# Patient Record
Sex: MALE | Race: WHITE
[De-identification: names, ages, dates, MRNs, and addresses within clinical notes are randomized per-mention and may not be internally consistent; named-entity substitution may affect disease eponyms.]

---

## 2018-06-02 ENCOUNTER — HOSPITAL ENCOUNTER (EMERGENCY)
Dept: HOSPITAL 61 - ER | Age: 43
Discharge: HOME | End: 2018-06-02
Payer: SELF-PAY

## 2018-06-02 DIAGNOSIS — J44.9: ICD-10-CM

## 2018-06-02 DIAGNOSIS — I10: ICD-10-CM

## 2018-06-02 DIAGNOSIS — M54.41: Primary | ICD-10-CM

## 2018-06-02 DIAGNOSIS — Z86.2: ICD-10-CM

## 2018-06-02 DIAGNOSIS — L03.311: ICD-10-CM

## 2018-06-02 DIAGNOSIS — E11.40: ICD-10-CM

## 2018-06-02 DIAGNOSIS — E78.00: ICD-10-CM

## 2018-06-02 LAB
ADD MAN DIFF?: NO
ALBUMIN SERPL-MCNC: 3.2 G/DL (ref 3.4–5)
ALBUMIN/GLOB SERPL: 0.6 {RATIO} (ref 1–1.7)
ALP SERPL-CCNC: 89 U/L (ref 46–116)
ALT (SGPT): 44 U/L (ref 16–63)
AMPHETAMINE/METHAMPHETAMINE: (no result)
ANION GAP SERPL CALC-SCNC: 11 MMOL/L (ref 6–14)
AST SERPL-CCNC: 27 U/L (ref 15–37)
BACTERIA,URINE: 0 /HPF
BARBITURATES: (no result)
BASO #: 0 X10^3/UL (ref 0–0.2)
BASO %: 1 % (ref 0–3)
BENZODIAZEPINES: (no result)
BILIRUBIN,URINE: NEGATIVE
BLOOD UREA NITROGEN: 17 MG/DL (ref 8–26)
BUN/CREAT SERPL: 17 (ref 6–20)
CALCIUM: 9.2 MG/DL (ref 8.5–10.1)
CANNABINOIDS: (no result)
CHLORIDE: 99 MMOL/L (ref 98–107)
CLARITY,URINE: CLEAR
CO2 SERPL-SCNC: 28 MMOL/L (ref 21–32)
COCAINE: (no result)
COLOR,URINE: YELLOW
CREAT SERPL-MCNC: 1 MG/DL (ref 0.7–1.3)
EOS #: 0.2 X10^3/UL (ref 0–0.7)
EOS %: 3 % (ref 0–3)
ETHANOL, URINE: (no result)
ETHANOL: < 10 MG/DL (ref 0–10)
GFR SERPLBLD BASED ON 1.73 SQ M-ARVRAT: 81.2 ML/MIN
GLOBULIN SER-MCNC: 5.1 G/DL (ref 2.2–3.8)
GLUCOSE SERPL-MCNC: 146 MG/DL (ref 70–99)
GLUCOSE,URINE: NEGATIVE MG/DL
HCG SERPL-ACNC: 8.3 X10^3/UL (ref 4–11)
HEMATOCRIT: 33.6 % (ref 39–53)
HEMOGLOBIN: 11.4 G/DL (ref 13–17.5)
LIPASE: 854 U/L (ref 73–393)
LYMPH #: 2.2 X10^3/UL (ref 1–4.8)
LYMPH %: 27 % (ref 24–48)
MEAN CORPUSCULAR HEMOGLOBIN: 29 PG (ref 25–35)
MEAN CORPUSCULAR HGB CONC: 34 G/DL (ref 31–37)
MEAN CORPUSCULAR VOLUME: 86 FL (ref 79–100)
METHADONE: (no result)
MONO #: 0.4 X10^3/UL (ref 0–1.1)
MONO %: 5 % (ref 0–9)
NEUT #: 5.5 X10^3UL (ref 1.8–7.7)
NEUT %: 65 % (ref 31–73)
NITRITE,URINE: NEGATIVE
OPIATES: (no result)
PH,URINE: 5.5
PHENCYCLIDINE: (no result)
PLATELET COUNT: 265 X10^3/UL (ref 140–400)
POTASSIUM SERPL-SCNC: 4.1 MMOL/L (ref 3.5–5.1)
PROTEIN,URINE: NEGATIVE MG/DL
RBC,URINE: 0 /HPF (ref 0–2)
RED BLOOD COUNT: 3.91 X10^6/UL (ref 4.3–5.7)
RED CELL DISTRIBUTION WIDTH: 17 % (ref 11.5–14.5)
SODIUM: 138 MMOL/L (ref 136–145)
SPECIFIC GRAVITY,URINE: 1.02
SQUAMOUS EPITHELIAL CELL,UR: (no result) /LPF
TOTAL BILIRUBIN: 0.4 MG/DL (ref 0.2–1)
TOTAL PROTEIN: 8.3 G/DL (ref 6.4–8.2)
UROBILINOGEN,URINE: 0.2 MG/DL
WBC,URINE: (no result) /HPF (ref 0–4)

## 2018-06-02 PROCEDURE — 80307 DRUG TEST PRSMV CHEM ANLYZR: CPT

## 2018-06-02 PROCEDURE — 83690 ASSAY OF LIPASE: CPT

## 2018-06-02 PROCEDURE — 85025 COMPLETE CBC W/AUTO DIFF WBC: CPT

## 2018-06-02 PROCEDURE — 99285 EMERGENCY DEPT VISIT HI MDM: CPT

## 2018-06-02 PROCEDURE — 81001 URINALYSIS AUTO W/SCOPE: CPT

## 2018-06-02 PROCEDURE — 36415 COLL VENOUS BLD VENIPUNCTURE: CPT

## 2018-06-02 PROCEDURE — 96375 TX/PRO/DX INJ NEW DRUG ADDON: CPT

## 2018-06-02 PROCEDURE — 74176 CT ABD & PELVIS W/O CONTRAST: CPT

## 2018-06-02 PROCEDURE — 80053 COMPREHEN METABOLIC PANEL: CPT

## 2018-06-02 PROCEDURE — 96374 THER/PROPH/DIAG INJ IV PUSH: CPT

## 2018-06-02 RX ADMIN — FENTANYL CITRATE 1 MCG: 50 INJECTION INTRAMUSCULAR; INTRAVENOUS at 10:26

## 2018-06-02 RX ADMIN — ONDANSETRON 1 MG: 2 INJECTION INTRAMUSCULAR; INTRAVENOUS at 10:24

## 2018-06-02 RX ADMIN — CLINDAMYCIN HYDROCHLORIDE 1 MG: 150 CAPSULE ORAL at 12:39

## 2018-06-02 RX ADMIN — HYDROCODONE BITARTRATE AND ACETAMINOPHEN 1 TAB: 5; 325 TABLET ORAL at 12:40

## 2018-10-25 ENCOUNTER — HOSPITAL ENCOUNTER (OUTPATIENT)
Dept: HOSPITAL 61 - CCL | Age: 43
Discharge: HOME | End: 2018-10-25
Attending: INTERNAL MEDICINE
Payer: COMMERCIAL

## 2018-10-25 VITALS — DIASTOLIC BLOOD PRESSURE: 49 MMHG | SYSTOLIC BLOOD PRESSURE: 118 MMHG

## 2018-10-25 VITALS — SYSTOLIC BLOOD PRESSURE: 132 MMHG | DIASTOLIC BLOOD PRESSURE: 56 MMHG

## 2018-10-25 VITALS — DIASTOLIC BLOOD PRESSURE: 73 MMHG | SYSTOLIC BLOOD PRESSURE: 136 MMHG

## 2018-10-25 VITALS — DIASTOLIC BLOOD PRESSURE: 64 MMHG | SYSTOLIC BLOOD PRESSURE: 140 MMHG

## 2018-10-25 VITALS — DIASTOLIC BLOOD PRESSURE: 65 MMHG | SYSTOLIC BLOOD PRESSURE: 159 MMHG

## 2018-10-25 VITALS — SYSTOLIC BLOOD PRESSURE: 125 MMHG | DIASTOLIC BLOOD PRESSURE: 58 MMHG

## 2018-10-25 VITALS — DIASTOLIC BLOOD PRESSURE: 63 MMHG | SYSTOLIC BLOOD PRESSURE: 115 MMHG

## 2018-10-25 VITALS — HEIGHT: 66 IN | WEIGHT: 315 LBS | BODY MASS INDEX: 50.62 KG/M2

## 2018-10-25 VITALS — DIASTOLIC BLOOD PRESSURE: 66 MMHG | SYSTOLIC BLOOD PRESSURE: 137 MMHG

## 2018-10-25 VITALS — SYSTOLIC BLOOD PRESSURE: 152 MMHG | DIASTOLIC BLOOD PRESSURE: 73 MMHG

## 2018-10-25 VITALS — DIASTOLIC BLOOD PRESSURE: 66 MMHG | SYSTOLIC BLOOD PRESSURE: 138 MMHG

## 2018-10-25 VITALS — DIASTOLIC BLOOD PRESSURE: 73 MMHG | SYSTOLIC BLOOD PRESSURE: 133 MMHG

## 2018-10-25 VITALS — DIASTOLIC BLOOD PRESSURE: 70 MMHG | SYSTOLIC BLOOD PRESSURE: 136 MMHG

## 2018-10-25 DIAGNOSIS — I20.0: Primary | ICD-10-CM

## 2018-10-25 DIAGNOSIS — I10: ICD-10-CM

## 2018-10-25 DIAGNOSIS — Z79.4: ICD-10-CM

## 2018-10-25 DIAGNOSIS — Z79.01: ICD-10-CM

## 2018-10-25 DIAGNOSIS — E66.9: ICD-10-CM

## 2018-10-25 DIAGNOSIS — Z79.899: ICD-10-CM

## 2018-10-25 DIAGNOSIS — Z79.82: ICD-10-CM

## 2018-10-25 LAB
ANION GAP SERPL CALC-SCNC: 6 MMOL/L (ref 6–14)
BUN SERPL-MCNC: 37 MG/DL (ref 8–26)
CALCIUM SERPL-MCNC: 11 MG/DL (ref 8.5–10.1)
CHLORIDE SERPL-SCNC: 98 MMOL/L (ref 98–107)
CO2 SERPL-SCNC: 29 MMOL/L (ref 21–32)
CREAT SERPL-MCNC: 1.3 MG/DL (ref 0.7–1.3)
ERYTHROCYTE [DISTWIDTH] IN BLOOD BY AUTOMATED COUNT: 16.2 % (ref 11.5–14.5)
GFR SERPLBLD BASED ON 1.73 SQ M-ARVRAT: 59.7 ML/MIN
GLUCOSE SERPL-MCNC: 159 MG/DL (ref 70–99)
HCT VFR BLD CALC: 32.8 % (ref 39–53)
HGB BLD-MCNC: 11.3 G/DL (ref 13–17.5)
MCH RBC QN AUTO: 31 PG (ref 25–35)
MCHC RBC AUTO-ENTMCNC: 35 G/DL (ref 31–37)
MCV RBC AUTO: 89 FL (ref 79–100)
PLATELET # BLD AUTO: 307 X10^3/UL (ref 140–400)
POTASSIUM SERPL-SCNC: 4.9 MMOL/L (ref 3.5–5.1)
PROTHROMBIN TIME: 13.3 SEC (ref 11.7–14)
RBC # BLD AUTO: 3.67 X10^6/UL (ref 4.3–5.7)
SODIUM SERPL-SCNC: 133 MMOL/L (ref 136–145)
WBC # BLD AUTO: 10.1 X10^3/UL (ref 4–11)

## 2018-10-25 PROCEDURE — 80048 BASIC METABOLIC PNL TOTAL CA: CPT

## 2018-10-25 PROCEDURE — 93567 NJX CAR CTH SPRVLV AORTGRPHY: CPT

## 2018-10-25 PROCEDURE — 85610 PROTHROMBIN TIME: CPT

## 2018-10-25 PROCEDURE — C1892 INTRO/SHEATH,FIXED,PEEL-AWAY: HCPCS

## 2018-10-25 PROCEDURE — C1769 GUIDE WIRE: HCPCS

## 2018-10-25 PROCEDURE — 99152 MOD SED SAME PHYS/QHP 5/>YRS: CPT

## 2018-10-25 PROCEDURE — G0269 OCCLUSIVE DEVICE IN VEIN ART: HCPCS

## 2018-10-25 PROCEDURE — 99153 MOD SED SAME PHYS/QHP EA: CPT

## 2018-10-25 PROCEDURE — C1771 REP DEV, URINARY, W/SLING: HCPCS

## 2018-10-25 PROCEDURE — 85027 COMPLETE CBC AUTOMATED: CPT

## 2018-10-25 PROCEDURE — 36415 COLL VENOUS BLD VENIPUNCTURE: CPT

## 2018-10-25 PROCEDURE — 93458 L HRT ARTERY/VENTRICLE ANGIO: CPT

## 2018-10-25 NOTE — PDOC
MODERATE SEDATION ASSESSMENT


RISKS/ALTERNATIVES


Risks/Alternatives


Risks and alternatives of this type of sedation and procedure discussed with:


RISK/ALTERNATIVES:  Patient





H & P ON CHART


H & P


H & P on chart and reviewed for co-morbid conditions and appropriate labs.


H&P ON CHART:  Yes





PREGNANCY STATUS


PREG STATUS ASSESSED:  Yes





MEDS/ALLERGIES REVIEWED


Meds/Allergies Reviewed


Medications and Allergies including time and route of recently administered 

narcotics and sedatives.


MEDS/ALLERGIES REVIEWED:  Yes





ASA RATING


ASA RATING:  II





AIRWAY ASSESSMENT


Airway Assessment


Airway patency, oral function limitations, presence  of caps, crowns, dentures, 

partials, and ability to extend neck assessed.


AIRWAY ASSESSMENT:  Yes





MALLAMPATI SCORE


MALLAMPATI SCORE:  II





PRE-SEDATION ASSESSMENT


PRE-SEDATION ASSESSMENT:  Yes











BRANDON DE LOS SANTOS MD Oct 25, 2018 14:45

## 2018-10-26 NOTE — CARD
MR#: [id number]

Account#: [id number]

Accession#: [id number]

Date of Study: 10/25/2018

Ordering Physician: DANNY FAULKNER 

Referring Physician: DANNY FAULKNER 

Tech: RT Seamus (R)





--------------- APPROVED REPORT --------------





Technologist: RT Seamus (R)

Nurse: Yvonne Urena R.N.



Procedure(s) performed: Moderate sedation: 36 minutes



HISTORY

The patient is a 43 year-old male with a history of : hypertension, family history of premature CAD, 
Patient is obese..



INDICATION

The indication(s) include : The patient is having progressive exertional chest pain and was seen as a
n outpatient. We discussed the situation options and risks and he was decided to proceed with a left 
heart catheterization., He is coming in for the procedure..



PROCEDURE NARRATIVE

After obtaining informed consent the patient was taken to the Cath Lab.



With the patient is supine position the right groin area was prepped and draped in the usual fashion.




The area was infiltrated with lidocaine to obtain topical anesthesia.



Using Seldinger technique a Cordis sheath was inserted into the femoral artery.



A left Peter catheter was then advanced all the way to the root of the AO and once there we searche
d the left coronary cusp for the left main.



No left coronary was found.



A view of the left coronary cusp was done.



A right Peter catheter was then advanced and utilized to engage the right coronary os.



Multiple views of the right coronary artery were then done. We found that the patient has aberrant co
ronary anatomy with everything coming off the RCA.



After the views of the coronaries were done a pigtail catheter was then used to perform a ventriculog
garret.



The views were checked and I decided to terminate the procedure at this point.



A view of the femoral artery was done following which the sheath was pulled and then an Angio-Seal co
llagen plug was deployed.



Following this it did not appear to be any bleeding. A dressing was applied to the groin and the arthur
ent was transferred to the recovery room in satisfactory condition after tolerating the procedure rat
her well.



Findings:



Coronaries:

There is no coronary coming off the left coronary cusp. The right coronary artery is a large vessel t
hat gives 2 branches of the proximal segment one traverses across to the LAD and feeds the LAD. The L
AD is normal and gives off small diagonals that are normal,  from the proximal segment of the LAD a v
essel runs off  to the lateral wall

along  the AV groove and this  probably represents the circumflex. It is a small size circumflex. The
 second branch that comes off the proximal RCA goes to the lateral wall and gives off an obtuse eben
nal and it is normal. The RCA then goes along its normal course and gives off the PDA and the postero
lateral branch they are all normal and large.



Ventriculogram: There appears to be concentric left ventricular hypertrophy and the left ventricular 
ejection fraction was estimated to be about 50% visually. The left ventricular end-diastolic pressure
 was 20 mmHg. There was no gradient across the aortic valve on the pullback.









Conclusion

This patient has aberrant coronary anatomy. There is no left main, everything is coming off the RCA a
nd no significant plaquing or disease was seen.



The left ventricle shows mild concentric left ventricular hypertrophy with a low normal ejection frac
tion that was estimated to be about 50%. The patient has a left ventricular end-diastolic pressure of
 20 mmHg.



I would recommend medical treatment, diet, exercise, weight loss, and tight blood pressure control fo
r this patient.







Recommendations

Medical Therapy

Weight Loss Reduction Program



Signed by : Danny Faulkner MD

Electronically Approved : 10/26/2018 16:48:26

## 2019-08-31 ENCOUNTER — HOSPITAL ENCOUNTER (EMERGENCY)
Dept: HOSPITAL 61 - ER | Age: 44
Discharge: HOME | End: 2019-08-31
Payer: SELF-PAY

## 2019-08-31 VITALS — WEIGHT: 315 LBS | BODY MASS INDEX: 50.62 KG/M2 | HEIGHT: 66 IN

## 2019-08-31 VITALS — DIASTOLIC BLOOD PRESSURE: 76 MMHG | SYSTOLIC BLOOD PRESSURE: 161 MMHG

## 2019-08-31 DIAGNOSIS — I10: ICD-10-CM

## 2019-08-31 DIAGNOSIS — E78.00: ICD-10-CM

## 2019-08-31 DIAGNOSIS — J44.9: ICD-10-CM

## 2019-08-31 DIAGNOSIS — M25.551: Primary | ICD-10-CM

## 2019-08-31 DIAGNOSIS — E11.40: ICD-10-CM

## 2019-08-31 PROCEDURE — 99284 EMERGENCY DEPT VISIT MOD MDM: CPT

## 2019-08-31 PROCEDURE — 73502 X-RAY EXAM HIP UNI 2-3 VIEWS: CPT

## 2019-08-31 NOTE — PHYS DOC
Past Medical History


Past Medical History:  Anemia, COPD, Diabetes-Type II, High Cholesterol, H

ypertension, Other


Additional Past Medical Histor:  neuropathy, degenerative rheumatoid arthritis 

bilateral hip/back


Past Surgical History:  No Surgical History


Alcohol Use:  Occasionally


Drug Use:  None





Adult General


Chief Complaint


Chief Complaint:  HIP PAIN





HPI


HPI





Patient is a 44  year old male] who presents with [right hip pain. Patient repor

ts he has a history of several right hip issues, reports he has a lot of 

degeneration both hips, reports while he was walking today he felt his pain in 

his right hip where felt like his hepatitis. Reports he fell to go back and he 

says it has done this several times in the past. Reports after did last time 

today he feels like it has continued to have little increased pain. Denies any 

falls, denies any recent trauma. Denies any recent fever. Reports he has been 

told he needs replacement, he has not had insurance to be able to do this 

before, does report he has a primary care provider and they have changed his 

pain medications at home. States he has taken" before he came in, with some 

improvement.]





Review of Systems


Review of Systems





Constitutional: Denies fever or chills []


Eyes: Denies change in visual acuity, redness, or eye pain []


HENT: Denies nasal congestion or sore throat []


Respiratory: Denies cough or shortness of breath []


Cardiovascular: No additional information not addressed in HPI []


GI: Denies abdominal pain, nausea, vomiting, bloody stools or diarrhea []


: Denies dysuria or hematuria []


Musculoskeletal: Denies back pain complains of right hip pain []


Integument: Denies rash or skin lesions []


Neurologic: Denies headache, focal weakness or sensory changes []


Endocrine: Denies polyuria or polydipsia []





All other systems were reviewed and found to be within normal limits, except as 

documented in this note.





Current Medications


Current Medications





Current Medications








 Medications


  (Trade)  Dose


 Ordered  Sig/Danielito  Start Time


 Stop Time Status Last Admin


Dose Admin


 


 Acetaminophen/


 Hydrocodone Bitart


  (Lortab 5/325)  1 tab  1X  ONCE  8/31/19 14:00


 8/31/19 14:01 DC 8/31/19 14:06


1 TAB











Allergies


Allergies





Allergies








Coded Allergies Type Severity Reaction Last Updated Verified


 


  No Known Drug Allergies    12/2/18 No











Physical Exam


Physical Exam





Constitutional: Well developed, well nourished, no acute distress, non-toxic 

appearance. []


HENT: Normocephalic, atraumatic, bilateral external ears normal, oropharynx 

moist, no oral exudates, nose normal. []


Eyes: PERRLA, EOMI, conjunctiva normal, no discharge. [] 


Neck: Normal range of motion, no tenderness, supple, no stridor. [] 


Cardiovascular:Heart rate regular rhythm, no murmur []


Lungs & Thorax:  Bilateral breath sounds clear to auscultation []


Abdomen: Bowel sounds normal, soft, no tenderness, no masses, no pulsatile 

masses. [] 


Skin: Warm, dry, no erythema, no rash. [] 


Back: No tenderness, no CVA tenderness. [] 


Extremities: No tenderness, no cyanosis, no clubbing, ROM intact, no edema. 

Slightly decreased ROAM to right leg. No swelling.[] 


Neurologic: Alert and oriented X 3, normal motor function, normal sensory 

function, no focal deficits noted. []


Psychologic: Affect normal, judgement normal, mood normal. []





Current Patient Data


Vital Signs





                                   Vital Signs








  Date Time  Temp Pulse Resp B/P (MAP) Pulse Ox O2 Delivery O2 Flow Rate FiO2


 


8/31/19 14:06   15  98 Room Air  


 


8/31/19 13:15 97.7 89  161/76 (104)    





 97.7       











EKG


EKG


[]





Radiology/Procedures


Radiology/Procedures


 


IMPRESSION:


1. Degenerative joint disease, severe at the right hip.


2. No definite acute fracture or dislocation. Detail somewhat limited due 


to body habitus. If clinical concern for fracture persists, consider 


further evaluation with MRI.


 


Electronically signed by: Fan Jimenez MD (8/31/2019 2:45 PM) [facility]


[]





Course & Med Decision Making


Course & Med Decision Making


Pertinent Labs and Imaging studies reviewed. (See chart for details)





[]





Dragon Disclaimer


Dragon Disclaimer


This electronic medical record was generated, in whole or in part, using a voice

 recognition dictation system.





Departure


Departure


Impression:  


   Primary Impression:  


   Right hip pain


Disposition:  01 HOME, SELF-CARE


Condition:  GOOD


Referrals:  


LAURA ROBBINS DO (PCP)


Patient Instructions:  Hip Pain





Additional Instructions:  


As we discussed, rest, take your home pain medications.


Follow up with your primary care


Try to get surgery for your hip as you have previously planned











ROZ GUZMÁN              Aug 31, 2019 15:11

## 2019-08-31 NOTE — RAD
HIP RIGHT 2V WITH PELVIS

 

History: Hip dislocation.

 

Limited bone detail due to body habitus. Severe degenerative narrowing at 

the right hip. No definite acute or displaced fracture is seen.

 

Mild-to-moderate degenerative narrowing at the left hip. No evidence of 

dislocation. Sacroiliac joints and pubic symphysis are grossly intact.

 

IMPRESSION:

1. Degenerative joint disease, severe at the right hip.

2. No definite acute fracture or dislocation. Detail somewhat limited due 

to body habitus. If clinical concern for fracture persists, consider 

further evaluation with MRI.

 

Electronically signed by: Fan Jimenez MD (8/31/2019 2:45 PM) [facility]

## 2020-04-14 ENCOUNTER — HOSPITAL ENCOUNTER (EMERGENCY)
Dept: HOSPITAL 61 - ER | Age: 45
Discharge: HOME | End: 2020-04-14
Payer: COMMERCIAL

## 2020-04-14 VITALS
SYSTOLIC BLOOD PRESSURE: 143 MMHG | DIASTOLIC BLOOD PRESSURE: 74 MMHG | SYSTOLIC BLOOD PRESSURE: 143 MMHG | DIASTOLIC BLOOD PRESSURE: 74 MMHG | SYSTOLIC BLOOD PRESSURE: 143 MMHG | DIASTOLIC BLOOD PRESSURE: 74 MMHG | DIASTOLIC BLOOD PRESSURE: 74 MMHG

## 2020-04-14 VITALS — BODY MASS INDEX: 50.62 KG/M2 | HEIGHT: 66 IN | WEIGHT: 315 LBS

## 2020-04-14 DIAGNOSIS — I10: ICD-10-CM

## 2020-04-14 DIAGNOSIS — E11.40: ICD-10-CM

## 2020-04-14 DIAGNOSIS — R07.89: Primary | ICD-10-CM

## 2020-04-14 DIAGNOSIS — E78.00: ICD-10-CM

## 2020-04-14 DIAGNOSIS — J44.9: ICD-10-CM

## 2020-04-14 LAB
ALBUMIN SERPL-MCNC: 3.1 G/DL (ref 3.4–5)
ALBUMIN/GLOB SERPL: 0.6 {RATIO} (ref 1–1.7)
ALP SERPL-CCNC: 74 U/L (ref 46–116)
ALT SERPL-CCNC: 23 U/L (ref 16–63)
ANION GAP SERPL CALC-SCNC: 9 MMOL/L (ref 6–14)
AST SERPL-CCNC: 22 U/L (ref 15–37)
BASOPHILS # BLD AUTO: 0 X10^3/UL (ref 0–0.2)
BASOPHILS NFR BLD: 0 % (ref 0–3)
BILIRUB SERPL-MCNC: 0.2 MG/DL (ref 0.2–1)
BUN SERPL-MCNC: 23 MG/DL (ref 8–26)
BUN/CREAT SERPL: 26 (ref 6–20)
CALCIUM SERPL-MCNC: 9.2 MG/DL (ref 8.5–10.1)
CHLORIDE SERPL-SCNC: 104 MMOL/L (ref 98–107)
CO2 SERPL-SCNC: 25 MMOL/L (ref 21–32)
CREAT SERPL-MCNC: 0.9 MG/DL (ref 0.7–1.3)
EOSINOPHIL NFR BLD: 0.2 X10^3/UL (ref 0–0.7)
EOSINOPHIL NFR BLD: 3 % (ref 0–3)
ERYTHROCYTE [DISTWIDTH] IN BLOOD BY AUTOMATED COUNT: 17.4 % (ref 11.5–14.5)
GFR SERPLBLD BASED ON 1.73 SQ M-ARVRAT: 90.8 ML/MIN
GLOBULIN SER-MCNC: 4.9 G/DL (ref 2.2–3.8)
GLUCOSE SERPL-MCNC: 159 MG/DL (ref 70–99)
HCT VFR BLD CALC: 34 % (ref 39–53)
HGB BLD-MCNC: 10.9 G/DL (ref 13–17.5)
LYMPHOCYTES # BLD: 2 X10^3/UL (ref 1–4.8)
LYMPHOCYTES NFR BLD AUTO: 28 % (ref 24–48)
MCH RBC QN AUTO: 27 PG (ref 25–35)
MCHC RBC AUTO-ENTMCNC: 32 G/DL (ref 31–37)
MCV RBC AUTO: 84 FL (ref 79–100)
MONO #: 0.3 X10^3/UL (ref 0–1.1)
MONOCYTES NFR BLD: 4 % (ref 0–9)
NEUT #: 4.7 X10^3/UL (ref 1.8–7.7)
NEUTROPHILS NFR BLD AUTO: 64 % (ref 31–73)
PLATELET # BLD AUTO: 222 X10^3/UL (ref 140–400)
POTASSIUM SERPL-SCNC: 4.7 MMOL/L (ref 3.5–5.1)
PROT SERPL-MCNC: 8 G/DL (ref 6.4–8.2)
RBC # BLD AUTO: 4.03 X10^6/UL (ref 4.3–5.7)
SODIUM SERPL-SCNC: 138 MMOL/L (ref 136–145)
WBC # BLD AUTO: 7.3 X10^3/UL (ref 4–11)

## 2020-04-14 PROCEDURE — 83880 ASSAY OF NATRIURETIC PEPTIDE: CPT

## 2020-04-14 PROCEDURE — 36415 COLL VENOUS BLD VENIPUNCTURE: CPT

## 2020-04-14 PROCEDURE — 71045 X-RAY EXAM CHEST 1 VIEW: CPT

## 2020-04-14 PROCEDURE — 80053 COMPREHEN METABOLIC PANEL: CPT

## 2020-04-14 PROCEDURE — 99285 EMERGENCY DEPT VISIT HI MDM: CPT

## 2020-04-14 PROCEDURE — 85025 COMPLETE CBC W/AUTO DIFF WBC: CPT

## 2020-04-14 PROCEDURE — 85379 FIBRIN DEGRADATION QUANT: CPT

## 2020-04-14 PROCEDURE — 93005 ELECTROCARDIOGRAM TRACING: CPT

## 2020-04-14 PROCEDURE — 96374 THER/PROPH/DIAG INJ IV PUSH: CPT

## 2020-04-14 PROCEDURE — 84484 ASSAY OF TROPONIN QUANT: CPT

## 2020-04-14 NOTE — EKG
[facility]

              [address] [place] [address]

Test Date:    2020               Test Time:    09:54:12

Pat Name:     CHRISTOPHER GOOD           Department:   

Patient ID:   [id number]           Room:          

Gender:       M                        Technician:   

:          [id number]               Requested By: EM MILLER

Order Number: [id number]           Reading MD:   Dmitriy Arreguin

                                 Measurements

Intervals                              Axis          

Rate:         86                       P:            35

TX:           194                      QRS:          56

QRSD:         84                       T:            34

QT:           326                                    

QTc:          393                                    

                           Interpretive Statements

SINUS RHYTHM

NONSPECIFIC ST-T WAVE CHANGES.



Electronically Signed On 2020 11:28:25 CDT by Dmitriy Arreguin

## 2020-04-14 NOTE — RAD
CHEST AP ONLY

 

History: Chest pain

 

Comparison: June 12, 2015

 

Findings:

Single view of the chest is submitted. 

There is no new lobar consolidation, pleural fluid, or pneumothorax. 

Appearance of more prominent pericardial cardiac silhouette and 

mediastinal width may be accentuated by differences in technique.

 

Impression: 

 

1. Appearance of more prominent pericardial cardiac silhouette and 

mediastinal width may be accentuated by differences in technique, no 

infiltrate or pleural fluid identified.

 

Electronically signed by: Moise Sierra MD (4/14/2020 11:04 AM) 

[id number]

## 2020-04-14 NOTE — PHYS DOC
Past Medical History


Past Medical History:  Anemia, COPD, Diabetes-Type II, High Cholesterol, H

ypertension, Other


Additional Past Medical Histor:  neuropathy, degenerative rheumatoid arthritis 

bilateral hip/back


Past Surgical History:  No Surgical History


Smoking Status:  Never Smoker


Alcohol Use:  Occasionally


Drug Use:  None





General Adult


EDM:


Chief Complaint:  CHEST PAIN





HPI:


HPI:





Patient is a 44-year-old male with multiple medical problems including 

hypertension hyperlipidemia and diabetes who presents with a 2 to 3-day history 

of sharp left-sided chest pain.  He denies any fever chills or sweats.  He 

denies cough.  He denies hemoptysis.  He states the pain is made worse with 

movement and deep breath.  He does state the pain radiates up into his 

shoulders.  He denies any nausea vomiting or diaphoresis.  []





Review of Systems:


Review of Systems:


Constitutional:  Denies fever or chills. []


Eyes:  Denies change in visual acuity. []


HENT:  Denies nasal congestion or sore throat. [] 


Respiratory:  Denies cough or shortness of breath. [] 


Cardiovascular: Per HPI. [] 


GI:  Denies abdominal pain, nausea, vomiting, bloody stools or diarrhea. [] 


:  Denies dysuria. [] 


Musculoskeletal:  Denies back pain or joint pain. [] 


Integument:  Denies rash. [] 


Neurologic:  Denies headache, focal weakness or sensory changes. [] 


Endocrine:  Denies polyuria or polydipsia. [] 


Lymphatic:  Denies swollen glands. [] 


Psychiatric:  Denies depression or anxiety. []





Heart Score:


HEART Score for Chest Pain:  








HEART Score for Chest Pain Response (Comments) Value


 


History Slighlty/Non-Suspicious 0


 


ECG Normal 0


 


Age >45 - < 65 1


 


Risk Factors >3 Risk Factors or Hx CAD 2


 


Troponin < Normal Limit 0


 


Total  3








Risk Factors:


Risk Factors:  DM, Current or recent (<one month) smoker, HTN, HLP, family 

history of CAD, obesity.


Risk Scores:


Score 0 - 3:  2.5% MACE over next 6 weeks - Discharge Home


Score 4 - 6:  20.3% MACE over next 6 weeks - Admit for Clinical Observation


Score 7 - 10:  72.7% MACE over next 6 weeks - Early Invasive Strategies





Allergies:


Allergies:





Allergies








Coded Allergies Type Severity Reaction Last Updated Verified


 


  No Known Drug Allergies    12/2/18 No











Physical Exam:


PE:





Constitutional: Well developed, well nourished, no acute distress, non-toxic 

appearance. []


HENT: Normocephalic, atraumatic, bilateral external ears normal, oropharynx 

moist, no oral exudates, nose normal. []


Eyes: PERRLA, EOMI, conjunctiva normal, no discharge. [] 


Neck: Normal range of motion, no tenderness, supple, no stridor. [] 


Cardiovascular:Heart rate regular rhythm, no murmur []


Lungs & Thorax: Tender to palp left sternal border which reproduces pain []


Abdomen: Bowel sounds normal, soft, no tenderness, no masses, no pulsatile 

masses. [] 


Skin: Warm, dry, no erythema, no rash. [] 


Back: No tenderness, no CVA tenderness. [] 


Extremities: No tenderness, no cyanosis, no clubbing, ROM intact, no edema. [] 


Neurologic: Alert and oriented X 3, normal motor function, normal sensory 

function, no focal deficits noted. []


Psychologic: Anxious. []





Current Patient Data:


Vital Signs:





                                   Vital Signs








  Date Time  Temp Pulse Resp B/P (MAP) Pulse Ox O2 Delivery O2 Flow Rate FiO2


 


4/14/20 09:50 97.9 87 16 168/79 (108) 97 Room Air  





 97.9       











EKG:


EKG:


EKG: Normal sinus rhythm rate of 80 without ischemic ST-T changes []





Radiology/Procedures:


Radiology/Procedures:


[]


Impression:


STATUS: REG ER            ORD. PHYSICIAN: EM MILLER DO


REASON: chest pain


PROCEDURE: CHEST AP ONLY





CHEST AP ONLY


 


History: Chest pain


 


Comparison: June 12, 2015


 


Findings:


Single view of the chest is submitted. 


There is no new lobar consolidation, pleural fluid, or pneumothorax. 


Appearance of more prominent pericardial cardiac silhouette and 


mediastinal width may be accentuated by differences in technique.


 


Impression: 


 


1. Appearance of more prominent pericardial cardiac silhouette and 


mediastinal width may be accentuated by differences in technique, no 


infiltrate or pleural fluid identified.





Course & Med Decision Making:


Course & Med Decision Making


Pertinent Labs and Imaging studies reviewed. (See chart for details)





[ED course: Evaluation reveals an anxious 44-year-old male with reproducible 

left-sided chest pain.  His troponin and EKG and d-dimer were all negative.  His

 EKG was unrevealing.  I reassured the patient that I do not believe this is a 

cardiac event.  I will have the patient follow-up with his primary care 

physician.  I will have him start on naproxen for symptom relief.]





Dragon Disclaimer:


Dragon Disclaimer:


This electronic medical record was generated, in whole or in part, using a voice

recognition dictation system.





Departure


Departure


Impression:  


   Primary Impression:  


   Chest wall pain


Disposition:  01 HOME, SELF-CARE


Condition:  STABLE


Referrals:  


LAURA ROBBINS DO (PCP)


Patient Instructions:  Chest Pain (Nonspecific)





Additional Instructions:  


Return to the emergency department with any new or concerning symptoms


Scripts


Naproxen (NAPROXEN) 500 Mg Tablet


1 TAB PO BID PRN for PAIN, #30 TAB 1 Refill


   Prov: EM MILLER DO         4/14/20











EM MILLER DO             Apr 14, 2020 10:49

## 2020-06-10 ENCOUNTER — HOSPITAL ENCOUNTER (OUTPATIENT)
Dept: HOSPITAL 61 - SLPLAB | Age: 45
Discharge: HOME | End: 2020-06-10
Attending: INTERNAL MEDICINE
Payer: COMMERCIAL

## 2020-06-10 DIAGNOSIS — G47.61: ICD-10-CM

## 2020-06-10 DIAGNOSIS — G47.33: Primary | ICD-10-CM

## 2020-06-10 DIAGNOSIS — R09.02: ICD-10-CM

## 2020-06-10 PROCEDURE — 95810 POLYSOM 6/> YRS 4/> PARAM: CPT

## 2020-06-12 NOTE — SLEEP
DATE OF STUDY:  06/10/2020



PROCEDURE:  Sleep study.



REFERRING PHYSICIAN:  Adalgisa Gates MD



PRIMARY CARE PHYSICIAN:  Vinicius Oliver MD



The patient is 44 years old who weighs 347 pounds with a BMI of 56.  The

patient's Wilton score was 13.  The patient underwent split night study

performed at [facility].



During the night study, the patient spent 449 minutes in bed and slept for 333

minutes with a sleep efficiency of 74%.  Sleep latency was 95 minutes with a REM

latency of 82 minutes.  Sleep architecture showed normal stage 1 sleep and

normal stage 2 sleep, increased slow wave and normal REM sleep.



During the initial diagnostic portion of the study, the patient slept for 85

minutes.  During that time, there were no obstructive, mixed or central apneas,

but 39 hypopneas.  The patient's AHI was 28 per hour.  Supine AHI 28 per hour

with a REM AHI of 60 per hour.



Nocturnal oximetry study revealed a mean oxygen saturation of 94% with the

lowest of 74%.  A 11.6% of time oxygen saturation remained between 80% and 89%.



EKG with a mean heart rate of 93 beats per minute.  No sustained arrhythmias

observed.



PLMs were seen at index of 30 per hour and 5 per hour caused EEG arousals.



The patient was started on CPAP at 5 cm water and titrated up to 15 cm water. 

At the final pressure, the patient slept for 46 minutes.  The patient had supine

sleep as well as REM sleep.  The patient's AHI was reduced to 1 per hour and

oxygen saturation remained above 91%.  The patient used medium size full face

mask.



IMPRESSION:

1.  Moderate obstructive sleep apnea, with worsening during REM sleep.  Total

AHI 28 per hour with a REM AHI of 60 per hour.

2.  Nocturnal hypoxia secondary to obstructive sleep apnea, but resolved with

CPAP.

3.  Moderate periodic limb movements.



RECOMMENDATIONS:

1.  CPAP at 15 cm water completely eliminated the patient's sleep apnea and

should be used on a nightly basis.

2.  Follow up in 4-6 weeks to assess compliance with CPAP and to document

clinical improvement.

3.  Weight loss is strongly advised.

4.  Avoid CNS depressants.

5.  Cautioned regarding driving until symptoms of sleep apnea resolve with the

use of CPAP.

6.  The patient should also be further evaluated for symptoms of restless legs

during the day.

 



______________________________

ELZA KNIGHT MD



DR:  LINDEN/lilo  JOB#:  [id number] / [id number]

DD:  06/12/2020 11:13  DT:  06/12/2020 11:23



ADALGISA Allen MD, TERRY MD

## 2020-07-16 ENCOUNTER — HOSPITAL ENCOUNTER (EMERGENCY)
Dept: HOSPITAL 61 - ER | Age: 45
Discharge: HOME | End: 2020-07-16
Payer: MEDICAID

## 2020-07-16 VITALS — HEIGHT: 66 IN | BODY MASS INDEX: 50.62 KG/M2 | WEIGHT: 315 LBS

## 2020-07-16 VITALS — SYSTOLIC BLOOD PRESSURE: 165 MMHG

## 2020-07-16 DIAGNOSIS — G89.11: ICD-10-CM

## 2020-07-16 DIAGNOSIS — M16.0: ICD-10-CM
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
## 2020-07-16 DIAGNOSIS — J44.9: ICD-10-CM

## 2020-07-16 DIAGNOSIS — M25.551: Primary | ICD-10-CM

## 2020-07-16 DIAGNOSIS — E11.40: ICD-10-CM
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
## 2020-07-16 DIAGNOSIS — I10: ICD-10-CM

## 2020-07-16 DIAGNOSIS — E78.00: ICD-10-CM
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
## 2020-07-16 PROCEDURE — 99283 EMERGENCY DEPT VISIT LOW MDM: CPT

## 2020-07-16 PROCEDURE — 96372 THER/PROPH/DIAG INJ SC/IM: CPT

## 2020-07-16 PROCEDURE — 73502 X-RAY EXAM HIP UNI 2-3 VIEWS: CPT

## 2020-07-16 NOTE — PHYS DOC
Past Medical History


Past Medical History:  Anemia, COPD, Diabetes-Type II, High Cholesterol, H

ypertension, Other


Additional Past Medical Histor:  neuropathy, degenerative rheumatoid arthritis 

bilateral hip/back


Past Surgical History:  No Surgical History


Smoking Status:  Never Smoker


Alcohol Use:  Occasionally


Drug Use:  None





General Adult


EDM:


Chief Complaint:  HIP PAIN





HPI:


HPI:





Patient is a 44  year old male presenting with hip pain.  Patient history of 

chronic hip pain degenerative joint disease get sciatica chronic pain obesity 

takes [place] at home.  He tripped and bumped his right hip on the side of the 

door has a increased pain and tenderness in that area.  His doctor recommended 

emergency room evaluation for pain control.  No other injury no head injury no 

no new changes.  No back injury just pinpoint injury at the lateral aspect of 

the right hip





Review of Systems:


Review of Systems:


Constitutional:   Denies fever or chills. []


Eyes:   Denies change in visual acuity. []


HENT:   Denies nasal congestion or sore throat. [] 


Respiratory:   Denies cough or shortness of breath. [] 


Cardiovascular:   Denies chest pain or edema. [] 


GI:   


Neurologic:   Denies headache, focal weakness or sensory changes. [] 


Endocrine:   Denies polyuria or polydipsia. [] 


Lymphatic:  Denies swollen glands. [] 


Psychiatric:  Denies depression or anxiety. []





Heart Score:


Risk Factors:


Risk Factors:  DM, Current or recent (<one month) smoker, HTN, HLP, family 

history of CAD, obesity.


Risk Scores:


Score 0 - 3:  2.5% MACE over next 6 weeks - Discharge Home


Score 4 - 6:  20.3% MACE over next 6 weeks - Admit for Clinical Observation


Score 7 - 10:  72.7% MACE over next 6 weeks - Early Invasive Strategies





Current Medications:





Current Medications








 Medications


  (Trade)  Dose


 Ordered  Sig/Danielito  Start Time


 Stop Time Status Last Admin


Dose Admin


 


 Ketorolac


 Tromethamine


  (Toradol Im)  30 mg  1X  ONCE  7/16/20 17:15


 7/16/20 17:18 DC 7/16/20 17:36


30 MG


 


 Oxycodone/


 Acetaminophen


  (Percocet 10/325)  1 tab  1X  ONCE  7/16/20 17:15


 7/16/20 17:18 DC 7/16/20 17:36


1 TAB











Allergies:


Allergies:





Allergies








Coded Allergies Type Severity Reaction Last Updated Verified


 


  No Known Drug Allergies    12/2/18 No











Physical Exam:


PE:





Constitutional: Well developed, over nourished, no acute distress, non-toxic 

appearance. []


HENT: Normocephalic, atraumatic, bilateral external ears normal, oropharynx 

moist, no oral exudates, nose normal. []


Eyes: PERRLA, EOMI, conjunctiva normal, no discharge. [] 


Neck: Normal range of motion, no tenderness, supple, no stridor. [] 


Pulmonary: Normal respiratory effort no increased work of breathing no obvious 

chest wall trauma


Abdomen: Bowel sounds normal, soft, no tenderness, no masses, no pulsatile 

masses. [] 


Skin: Warm, dry, no erythema, no rash. [] 


Back: No tenderness, no CVA tenderness. [] 


Extremities: Pinpoint tenderness over the greater trochanter of the right hip


Neurologic: Alert and oriented X 3, normal motor function, normal sensory 

function, no focal deficits noted. []


Psychologic: Affect normal, judgement normal, mood normal. []





Current Patient Data:


Vital Signs:





                                   Vital Signs








  Date Time  Temp Pulse Resp B/P (MAP) Pulse Ox O2 Delivery O2 Flow Rate FiO2


 


7/16/20 17:36      Room Air  


 


7/16/20 17:00 98.4 92 18 165/74 (104) 97   





 98.4       











EKG:


EKG:


[]





Radiology/Procedures:


Radiology/Procedures:


[]





Course & Med Decision Making:


Course & Med Decision Making


Pertinent Labs and Imaging studies reviewed. (See chart for details)





44-year-old male presenting with hip pain fairly minor trauma has baseline 

degenerative joint disease.'


XRAY negative my read.





Dragon Disclaimer:


Dragon Disclaimer:


This electronic medical record was generated, in whole or in part, using a voice

 recognition dictation system.





Departure


Departure


Impression:  


   Primary Impression:  


   Hip pain


Disposition:  01 HOME, SELF-CARE


Condition:  STABLE


Patient Instructions:  Contusion, Easy-to-Read





Justicifation of Admission Dx:


Justifications for Admission:


Justification of Admission Dx:  N/A











HUSAM LASSITER MD            Jul 16, 2020 17:41

## 2020-07-16 NOTE — RAD
Exam: Right hip 2 views

 

INDICATION: Trauma

 

TECHNIQUE: Frontal and frog-leg lateral views of the right hip

 

Comparisons: None

 

FINDINGS:

Evaluation is limited secondary to body habitus.

 

No displaced fractures are identified. There appears to be at least 

moderate degenerative change at the right hip joint. Soft tissues are 

unremarkable.

 

IMPRESSION:

No displaced fractures identified. Limitations as described above.

 

Electronically signed by: Bebeto Johnson MD (7/16/2020 6:08 PM) [id number]

## 2020-08-06 ENCOUNTER — HOSPITAL ENCOUNTER (OUTPATIENT)
Dept: HOSPITAL 61 - CT | Age: 45
Discharge: HOME | End: 2020-08-06
Attending: INTERNAL MEDICINE
Payer: MEDICAID

## 2020-08-06 DIAGNOSIS — I25.10: ICD-10-CM

## 2020-08-06 DIAGNOSIS — I35.8: Primary | ICD-10-CM

## 2020-08-06 DIAGNOSIS — R59.9: ICD-10-CM

## 2020-08-06 PROCEDURE — 71250 CT THORAX DX C-: CPT

## 2020-08-06 NOTE — RAD
CT CHEST HIGH RESOLUTION WO 

 

INDICATION:  HYPOXEMIA SOA  

 

Comparison: Radiograph 4/14/2020.

 

TECHNIQUE: Unenhanced axial CT sections were obtained through the lungs 

and upper abdomen. Coronal and sagittal multiplanar reconstructions were 

also obtained.

 

Unenhanced thin section axial images were obtained through the lungs. 

Inspiratory and expiratory supine high resolution images were obtained.

 

PQRS compliance statement:

 

One or more of the following individualized dose reduction techniques were

utilized for this examination:

1. Automated exposure control

2. Adjustment of the mA and/or kV according to patient size

3. Use of iterative reconstruction technique

 

FINDINGS:

 

Lungs and Airways: No pulmonary nodule.  No pulmonary mass or 

consolidation No endoluminal lesion. 

 

Inspiratory thin section images demonstrate no evidence of ground glass 

opacity, consolidation, bronchiectasis or interstitial lung disease. No 

pulmonary micronodules, cystic changes or pulmonary fibrosis.

 

Expiratory high resolution images demonstrate extensive bilateral air 

trapping.

 

Pleura: The pleural spaces are normal.

 

Heart and Mediastinum: The visualized portions of the thyroid gland are 

normal in size and attenuation. No axillary or supraclavicular 

lymphadenopathy. Enlarged right mediastinal lymph node measuring 1.2 cm 

(series 5 image 29). Calcified mediastinal and right hilar lymph nodes 

consistent with remote granulomatous disease. Cardiomegaly. Coronary 

artery atherosclerotic disease. Aortic valve leaflet calcification. Normal

caliber thoracic aorta 

 

Abdomen: Dystrophic left adrenal calcification may be due to old infection

or trauma.

 

Bones and Soft Tissues: Degenerative changes of the spine. The soft 

tissues of the chest wall are within normal limits.

 

IMPRESSION: 

1. No evidence of interstitial lung disease.

 

2. Extensive air trapping on expiratory phase imaging, likely due to small

airways disease.

 

3. Coronary artery atherosclerotic disease. Aortic valve leaflet 

calcifications.

 

4. Single enlarged 1.2 cm mediastinal lymph node of uncertain clinical 

significance.

 

Electronically signed by: Moise Rooney MD (8/6/2020 10:02 AM) [id number]

## 2020-08-18 ENCOUNTER — HOSPITAL ENCOUNTER (INPATIENT)
Dept: HOSPITAL 61 - ER | Age: 45
LOS: 2 days | Discharge: HOME | DRG: 603 | End: 2020-08-20
Attending: INTERNAL MEDICINE | Admitting: INTERNAL MEDICINE
Payer: MEDICAID

## 2020-08-18 VITALS — WEIGHT: 315 LBS | BODY MASS INDEX: 50.62 KG/M2 | HEIGHT: 66 IN

## 2020-08-18 DIAGNOSIS — E78.5: ICD-10-CM

## 2020-08-18 DIAGNOSIS — J44.9: ICD-10-CM

## 2020-08-18 DIAGNOSIS — L03.032: Primary | ICD-10-CM

## 2020-08-18 DIAGNOSIS — I10: ICD-10-CM

## 2020-08-18 DIAGNOSIS — G62.9: ICD-10-CM

## 2020-08-18 DIAGNOSIS — L97.529: ICD-10-CM

## 2020-08-18 DIAGNOSIS — E66.01: ICD-10-CM

## 2020-08-18 DIAGNOSIS — E78.00: ICD-10-CM

## 2020-08-18 DIAGNOSIS — E44.1: ICD-10-CM

## 2020-08-18 DIAGNOSIS — E11.621: ICD-10-CM

## 2020-08-18 DIAGNOSIS — E11.42: ICD-10-CM

## 2020-08-18 DIAGNOSIS — L84: ICD-10-CM

## 2020-08-18 DIAGNOSIS — Z82.49: ICD-10-CM

## 2020-08-18 LAB
ALBUMIN SERPL-MCNC: 3 G/DL (ref 3.4–5)
ALBUMIN/GLOB SERPL: 0.6 {RATIO} (ref 1–1.7)
ALP SERPL-CCNC: 79 U/L (ref 46–116)
ALT SERPL-CCNC: 20 U/L (ref 16–63)
ANION GAP SERPL CALC-SCNC: 6 MMOL/L (ref 6–14)
APTT PPP: YELLOW S
AST SERPL-CCNC: 12 U/L (ref 15–37)
BACTERIA #/AREA URNS HPF: 0 /HPF
BASOPHILS # BLD AUTO: 0.1 X10^3/UL (ref 0–0.2)
BASOPHILS NFR BLD: 1 % (ref 0–3)
BILIRUB SERPL-MCNC: 0.3 MG/DL (ref 0.2–1)
BILIRUB UR QL STRIP: NEGATIVE
BUN SERPL-MCNC: 23 MG/DL (ref 8–26)
BUN/CREAT SERPL: 19 (ref 6–20)
CALCIUM SERPL-MCNC: 9.3 MG/DL (ref 8.5–10.1)
CHLORIDE SERPL-SCNC: 99 MMOL/L (ref 98–107)
CO2 SERPL-SCNC: 31 MMOL/L (ref 21–32)
CREAT SERPL-MCNC: 1.2 MG/DL (ref 0.7–1.3)
EOSINOPHIL NFR BLD: 0.4 X10^3/UL (ref 0–0.7)
EOSINOPHIL NFR BLD: 5 % (ref 0–3)
ERYTHROCYTE [DISTWIDTH] IN BLOOD BY AUTOMATED COUNT: 16.8 % (ref 11.5–14.5)
FIBRINOGEN PPP-MCNC: CLEAR MG/DL
GFR SERPLBLD BASED ON 1.73 SQ M-ARVRAT: 64.9 ML/MIN
GLOBULIN SER-MCNC: 4.9 G/DL (ref 2.2–3.8)
GLUCOSE SERPL-MCNC: 265 MG/DL (ref 70–99)
HCT VFR BLD CALC: 32.7 % (ref 39–53)
HGB BLD-MCNC: 10.8 G/DL (ref 13–17.5)
LYMPHOCYTES # BLD: 1.7 X10^3/UL (ref 1–4.8)
LYMPHOCYTES NFR BLD AUTO: 22 % (ref 24–48)
MCH RBC QN AUTO: 29 PG (ref 25–35)
MCHC RBC AUTO-ENTMCNC: 33 G/DL (ref 31–37)
MCV RBC AUTO: 87 FL (ref 79–100)
MONO #: 0.4 X10^3/UL (ref 0–1.1)
MONOCYTES NFR BLD: 5 % (ref 0–9)
NEUT #: 5.3 X10^3/UL (ref 1.8–7.7)
NEUTROPHILS NFR BLD AUTO: 67 % (ref 31–73)
NITRITE UR QL STRIP: NEGATIVE
PH UR STRIP: 7.5 [PH]
PLATELET # BLD AUTO: 245 X10^3/UL (ref 140–400)
POTASSIUM SERPL-SCNC: 4.1 MMOL/L (ref 3.5–5.1)
PROT SERPL-MCNC: 7.9 G/DL (ref 6.4–8.2)
PROT UR STRIP-MCNC: NEGATIVE MG/DL
PROTHROMBIN TIME: 13.8 SEC (ref 11.7–14)
RBC # BLD AUTO: 3.76 X10^6/UL (ref 4.3–5.7)
RBC #/AREA URNS HPF: 0 /HPF (ref 0–2)
SODIUM SERPL-SCNC: 136 MMOL/L (ref 136–145)
SQUAMOUS #/AREA URNS LPF: (no result) /LPF
UROBILINOGEN UR-MCNC: 0.2 MG/DL
WBC # BLD AUTO: 8 X10^3/UL (ref 4–11)
WBC #/AREA URNS HPF: 0 /HPF (ref 0–4)

## 2020-08-18 PROCEDURE — 94640 AIRWAY INHALATION TREATMENT: CPT

## 2020-08-18 PROCEDURE — 87070 CULTURE OTHR SPECIMN AEROBIC: CPT

## 2020-08-18 PROCEDURE — 81001 URINALYSIS AUTO W/SCOPE: CPT

## 2020-08-18 PROCEDURE — 80048 BASIC METABOLIC PNL TOTAL CA: CPT

## 2020-08-18 PROCEDURE — G0378 HOSPITAL OBSERVATION PER HR: HCPCS

## 2020-08-18 PROCEDURE — 96365 THER/PROPH/DIAG IV INF INIT: CPT

## 2020-08-18 PROCEDURE — 96368 THER/DIAG CONCURRENT INF: CPT

## 2020-08-18 PROCEDURE — 94760 N-INVAS EAR/PLS OXIMETRY 1: CPT

## 2020-08-18 PROCEDURE — 36415 COLL VENOUS BLD VENIPUNCTURE: CPT

## 2020-08-18 PROCEDURE — 80053 COMPREHEN METABOLIC PANEL: CPT

## 2020-08-18 PROCEDURE — 96366 THER/PROPH/DIAG IV INF ADDON: CPT

## 2020-08-18 PROCEDURE — 85610 PROTHROMBIN TIME: CPT

## 2020-08-18 PROCEDURE — 73630 X-RAY EXAM OF FOOT: CPT

## 2020-08-18 PROCEDURE — 83605 ASSAY OF LACTIC ACID: CPT

## 2020-08-18 PROCEDURE — 93926 LOWER EXTREMITY STUDY: CPT

## 2020-08-18 PROCEDURE — 82962 GLUCOSE BLOOD TEST: CPT

## 2020-08-18 PROCEDURE — 85025 COMPLETE CBC W/AUTO DIFF WBC: CPT

## 2020-08-18 NOTE — PHYS DOC
Past Medical History


Past Medical History:  Anemia, COPD, Diabetes-Type II, High Cholesterol, H

ypertension, Other


Additional Past Medical Histor:  neuropathy, degenerative rheumatoid arthritis 

bilateral hip/back


Past Surgical History:  No Surgical History


Smoking Status:  Never Smoker


Alcohol Use:  Rarely


Drug Use:  None





General Adult


EDM:


Chief Complaint:  TOE PROBLEM





HPI:


HPI:





Patient is a 45  year old male who presents with an open wound on the greater 

toe of the left lower extremity.  Patient has a large callus on that toe that 

has formed a blister and now he has what looks like a stage II ulcer involving 

the plantar surface of the great toe on the left lower extremity.  In addition 

patient reports that he has had increasing pain in that left lower extremity for

the last 4 days.  Patient noted the blister 4 days ago and today states that the

ulcer on his foot seems to be getting bigger.  He denies fever, chills, sweats, 

chest pain, shortness of breath, change in smell or taste, nausea, vomiting, 

diarrhea, melena, hematochezia.  Patient does have a history of COPD and reports

he has a chronic cough that is unchanged and chronic shortness of breath that is

unchanged.





Review of Systems:


Review of Systems:


Constitutional:   Denies fever or chills. []


Eyes:   Denies change in visual acuity. []


HENT:   Denies nasal congestion or sore throat. [] 


Respiratory:   Denies cough or shortness of breath. [] 


Cardiovascular:   Denies chest pain or edema. [] 


GI:   Denies abdominal pain, nausea, vomiting, bloody stools or diarrhea. [] 


:  Denies dysuria. [] 


Musculoskeletal:   Denies back pain or joint pain. [] 


Integument:   Denies rash. [] 


Neurologic:   Denies headache, focal weakness or sensory changes. [] 


Endocrine:   Denies polyuria or polydipsia. [] 


Lymphatic:  Denies swollen glands. [] 


Psychiatric:  Denies depression or anxiety. []





Heart Score:


Risk Factors:


Risk Factors:  DM, Current or recent (<one month) smoker, HTN, HLP, family 

history of CAD, obesity.


Risk Scores:


Score 0 - 3:  2.5% MACE over next 6 weeks - Discharge Home


Score 4 - 6:  20.3% MACE over next 6 weeks - Admit for Clinical Observation


Score 7 - 10:  72.7% MACE over next 6 weeks - Early Invasive Strategies





Current Medications:





Current Medications








 Medications


  (Trade)  Dose


 Ordered  Sig/Danielito  Start Time


 Stop Time Status Last Admin


Dose Admin


 


 Piperacillin Sod/


 Tazobactam Sod


 4.5 gm/Sodium


 Chloride  100 ml @ 


 200 mls/hr  1X  ONCE  8/18/20 21:00


 8/18/20 21:29 UNV  





 


 Sodium Chloride  500 ml @ 


 500 mls/hr  1X  ONCE  8/18/20 21:00


 8/18/20 21:59 UNV  





 


 Vancomycin HCl


  (Vanco Per


 Pharmacy)  1 each  PRN DAILY  PRN  8/18/20 21:00


   UNV  














Allergies:


Allergies:





Allergies








Coded Allergies Type Severity Reaction Last Updated Verified


 


  No Known Drug Allergies    12/2/18 No











Physical Exam:


PE:





Constitutional: Well developed, morbidly obese, well nourished, no acute 

distress, non-toxic appearance. []


HENT: Normocephalic, atraumatic, bilateral external ears normal, oropharynx 

moist, no oral exudates, nose normal. []


Eyes: PERRLA, EOMI, conjunctiva normal, no discharge. [] 


Neck: Normal range of motion, no tenderness, supple, no stridor. [] 


Cardiovascular:Heart rate regular rhythm, grade 2/6 systolic murmur, no shift of

 PMI, pulses 1 out of 2 the dorsalis pedis bilaterally []


Lungs & Thorax:  Bilateral breath sounds clear to auscultation []


Abdomen: Bowel sounds normal, soft, no tenderness, no masses, no pulsatile 

masses. [] 


Skin: Warm, dry, patient with calluses on both feet as well as erythema and 

edema and calor of the foot calf which extends to just below the knee.  No joint

 effusions were noted.  There is no pain to range of motion of the joints and 

cells.. [] 


Back: No tenderness, no CVA tenderness. [] 


Extremities: No tenderness, no cyanosis, no clubbing, ROM intact, no edema. [] 


Neurologic: Alert and oriented X 3, normal motor function, normal sensory 

function, no focal deficits noted. []


Psychologic: Affect normal, judgement normal, mood normal. []





Current Patient Data:


Vital Signs:





                                   Vital Signs








  Date Time  Temp Pulse Resp B/P (MAP) Pulse Ox O2 Delivery O2 Flow Rate FiO2


 


8/18/20 20:35 97.6 84 12 161/95 (117) 95 Room Air  





 97.6       











EKG:


EKG:


[]





Radiology/Procedures:


Radiology/Procedures:


[]





Course & Med Decision Making:


Course & Med Decision Making


Pertinent Labs and Imaging studies reviewed. (See chart for details)


2219-blood cultures were not obtained prior to antibiotic administration because

 the patient does not have any sirs criteria and it is unlikely to grow anything

 given the isolated infection to the skin.


2344-patient was seen and reevaluated.  Patient responded well to pain 

medication provided here in the emergency department.  We will continue some 

pain medication PRN basis.  The patient was admitted to the hospital service.  

Zosyn and vancomycin was started empirically.


[]





Dragon Disclaimer:


Dragon Disclaimer:


This electronic medical record was generated, in whole or in part, using a voice

 recognition dictation system.





Departure


Departure


Referrals:  


BOOGIE RINCON MD (PCP)





Justicifation of Admission Dx:


Justifications for Admission:


Justification of Admission Dx:  Yes


Comments:


Diabetic foot infection











ADOLFO MURRAY MD          Aug 18, 2020 21:05

## 2020-08-19 VITALS — SYSTOLIC BLOOD PRESSURE: 143 MMHG | DIASTOLIC BLOOD PRESSURE: 69 MMHG

## 2020-08-19 VITALS — DIASTOLIC BLOOD PRESSURE: 68 MMHG | SYSTOLIC BLOOD PRESSURE: 144 MMHG

## 2020-08-19 VITALS — DIASTOLIC BLOOD PRESSURE: 73 MMHG | SYSTOLIC BLOOD PRESSURE: 114 MMHG

## 2020-08-19 VITALS — SYSTOLIC BLOOD PRESSURE: 139 MMHG | DIASTOLIC BLOOD PRESSURE: 92 MMHG

## 2020-08-19 VITALS — SYSTOLIC BLOOD PRESSURE: 112 MMHG | DIASTOLIC BLOOD PRESSURE: 46 MMHG

## 2020-08-19 VITALS — DIASTOLIC BLOOD PRESSURE: 56 MMHG | SYSTOLIC BLOOD PRESSURE: 118 MMHG

## 2020-08-19 LAB
ANION GAP SERPL CALC-SCNC: 6 MMOL/L (ref 6–14)
BASOPHILS # BLD AUTO: 0 X10^3/UL (ref 0–0.2)
BASOPHILS NFR BLD: 0 % (ref 0–3)
BUN SERPL-MCNC: 19 MG/DL (ref 8–26)
CALCIUM SERPL-MCNC: 8.8 MG/DL (ref 8.5–10.1)
CHLORIDE SERPL-SCNC: 99 MMOL/L (ref 98–107)
CO2 SERPL-SCNC: 33 MMOL/L (ref 21–32)
CREAT SERPL-MCNC: 1 MG/DL (ref 0.7–1.3)
EOSINOPHIL NFR BLD: 0.3 X10^3/UL (ref 0–0.7)
EOSINOPHIL NFR BLD: 5 % (ref 0–3)
ERYTHROCYTE [DISTWIDTH] IN BLOOD BY AUTOMATED COUNT: 17.2 % (ref 11.5–14.5)
GFR SERPLBLD BASED ON 1.73 SQ M-ARVRAT: 80.1 ML/MIN
GLUCOSE SERPL-MCNC: 226 MG/DL (ref 70–99)
HCT VFR BLD CALC: 33.5 % (ref 39–53)
HGB BLD-MCNC: 10.9 G/DL (ref 13–17.5)
LYMPHOCYTES # BLD: 1 X10^3/UL (ref 1–4.8)
LYMPHOCYTES NFR BLD AUTO: 16 % (ref 24–48)
MCH RBC QN AUTO: 28 PG (ref 25–35)
MCHC RBC AUTO-ENTMCNC: 33 G/DL (ref 31–37)
MCV RBC AUTO: 87 FL (ref 79–100)
MONO #: 0.4 X10^3/UL (ref 0–1.1)
MONOCYTES NFR BLD: 6 % (ref 0–9)
NEUT #: 4.5 X10^3/UL (ref 1.8–7.7)
NEUTROPHILS NFR BLD AUTO: 74 % (ref 31–73)
PLATELET # BLD AUTO: 216 X10^3/UL (ref 140–400)
POTASSIUM SERPL-SCNC: 4.3 MMOL/L (ref 3.5–5.1)
RBC # BLD AUTO: 3.83 X10^6/UL (ref 4.3–5.7)
SODIUM SERPL-SCNC: 138 MMOL/L (ref 136–145)
WBC # BLD AUTO: 6.2 X10^3/UL (ref 4–11)

## 2020-08-19 PROCEDURE — 0HBNXZZ EXCISION OF LEFT FOOT SKIN, EXTERNAL APPROACH: ICD-10-PCS | Performed by: EMERGENCY MEDICINE

## 2020-08-19 RX ADMIN — ALBUTEROL SULFATE SCH MG: 108 AEROSOL, METERED RESPIRATORY (INHALATION) at 19:55

## 2020-08-19 RX ADMIN — INSULIN LISPRO SCH UNITS: 100 INJECTION, SOLUTION INTRAVENOUS; SUBCUTANEOUS at 12:21

## 2020-08-19 RX ADMIN — INSULIN LISPRO SCH UNITS: 100 INJECTION, SOLUTION INTRAVENOUS; SUBCUTANEOUS at 17:26

## 2020-08-19 RX ADMIN — Medication SCH CAP: at 21:08

## 2020-08-19 RX ADMIN — MULTIPLE VITAMINS W/ MINERALS TAB SCH TAB: TAB at 10:41

## 2020-08-19 RX ADMIN — BUDESONIDE SCH MG: 0.5 INHALANT RESPIRATORY (INHALATION) at 11:00

## 2020-08-19 RX ADMIN — BUDESONIDE SCH MG: 0.5 INHALANT RESPIRATORY (INHALATION) at 15:28

## 2020-08-19 RX ADMIN — PIPERACILLIN SODIUM AND TAZOBACTAM SODIUM SCH MLS/HR: 4; .5 INJECTION, POWDER, LYOPHILIZED, FOR SOLUTION INTRAVENOUS at 05:58

## 2020-08-19 RX ADMIN — LISINOPRIL SCH MG: 20 TABLET ORAL at 10:41

## 2020-08-19 RX ADMIN — INSULIN LISPRO SCH UNITS: 100 INJECTION, SOLUTION INTRAVENOUS; SUBCUTANEOUS at 12:20

## 2020-08-19 RX ADMIN — METOPROLOL TARTRATE SCH MG: 25 TABLET ORAL at 21:09

## 2020-08-19 RX ADMIN — GABAPENTIN SCH MG: 300 CAPSULE ORAL at 21:08

## 2020-08-19 RX ADMIN — GABAPENTIN SCH MG: 300 CAPSULE ORAL at 14:37

## 2020-08-19 RX ADMIN — HYDROMORPHONE HYDROCHLORIDE PRN MG: 2 INJECTION INTRAMUSCULAR; INTRAVENOUS; SUBCUTANEOUS at 08:50

## 2020-08-19 RX ADMIN — HYDROMORPHONE HYDROCHLORIDE PRN MG: 2 INJECTION INTRAMUSCULAR; INTRAVENOUS; SUBCUTANEOUS at 17:20

## 2020-08-19 RX ADMIN — METOPROLOL TARTRATE SCH MG: 25 TABLET ORAL at 10:42

## 2020-08-19 RX ADMIN — BUDESONIDE SCH MG: 0.5 INHALANT RESPIRATORY (INHALATION) at 19:55

## 2020-08-19 RX ADMIN — ALBUTEROL SULFATE SCH MG: 108 AEROSOL, METERED RESPIRATORY (INHALATION) at 12:00

## 2020-08-19 RX ADMIN — PIPERACILLIN SODIUM AND TAZOBACTAM SODIUM SCH MLS/HR: 4; .5 INJECTION, POWDER, LYOPHILIZED, FOR SOLUTION INTRAVENOUS at 01:30

## 2020-08-19 RX ADMIN — ASPIRIN 81 MG SCH MG: 81 TABLET ORAL at 10:41

## 2020-08-19 RX ADMIN — PIPERACILLIN SODIUM AND TAZOBACTAM SODIUM SCH MLS/HR: 4; .5 INJECTION, POWDER, LYOPHILIZED, FOR SOLUTION INTRAVENOUS at 12:17

## 2020-08-19 RX ADMIN — ALBUTEROL SULFATE SCH MG: 108 AEROSOL, METERED RESPIRATORY (INHALATION) at 15:28

## 2020-08-19 RX ADMIN — PIPERACILLIN SODIUM AND TAZOBACTAM SODIUM SCH MLS/HR: 4; .5 INJECTION, POWDER, LYOPHILIZED, FOR SOLUTION INTRAVENOUS at 17:20

## 2020-08-19 NOTE — NUR
Pharmacy Vancomycin Dosing Note



S:Consulted to monitor and dose vancomycin started 08/18/20.



O:CHRISTOPHER GOOD is a 45 year old M with 

Cellulitis DIABETIC FOOT INFECTION .



Height: 5 feet, 6 inches

Weight: 163.6 kg

Ideal Body Weight: 63.80 

Adjusted Body Weight: 103.72 

Dosing Weight: Actual



Other Antibiotics: 

ZOSYN 4.5 GM Q6H



LABS:

Last BUN: 23 

Last Creatinine: 1.2 

Creatinine Clearance: 111 mL/min

Last WBC: 8 

Last Procalcitonin: 

Tmax (past 24 hours): 



Microbiology: 



I/O: 



Drug Levels:

Last  level:  on  at 

Last dose given 08/18/20 at 2200 



Vancomycin Dosing:

Loading Dose: 2000 mg x1

Dosing Weight: Actual

Target Trough: 10-20





A: Based on: WT AND CRCL





P: 1. Begin Vancomycin 1500 mg IV q8h

   2. Follow up Trough level on 08/19/20 at 2130 

   3. Pharmacy will continue to monitor, follow and adjust therapy as needed.

 

 MARIA M MCKEON RPH, 08/19/20 0021

-------------------------------------------------------------------------------

Signed:    08/19/20 at 0021 by MARIA M MCKEON RPH PHA

-------------------------------------------------------------------------------

## 2020-08-19 NOTE — PDOC1
History and Physical


Date of Admission


Date of Admission


DATE: 8/19/20 


TIME: 07:44





Identification/Chief Complaint


Chief Complaint


Left toe pain





Source


Source:  Patient





History of Present Illness


History of Present Illness


Patient is a 45-year-old male with past medical history of type 2 diabetes and 

diabetic neuropathy, who presents to the ER with complaints of a painful blister

to his left big toe for the past 4 days.  States he noticed a blister to his 

left big toe 5 days ago, and this blister ruptured 4 days ago.  Blister drained 

clear fluid, and patient denies any purulent drainage.  He denies any known 

injury.  He has treated his bloodstream with topical antibiotics, without any 

improvement.  He reports sharp pain, 6/10.  Denies any associated fever.





Past Medical History


Cardiovascular:  HTN, Hyperlipidemia


Pulmonary:  COPD


CENTRAL NERVOUS SYSTEM:  Periperal neuropathy


Hepatobiliary:  No pertinent hx


Musculoskeletal:   low back pain


Infectious disease:  No pertinent hx


Endocrine:  Diabetes





Past Surgical History


Past Surgical History:  No pertinent history





Family History


Family History:  Hypertension





Social History


Smoke:  No


ALCOHOL:  occassional


Drugs:  None





Current Problem List


Problems:  


(1) Diabetic foot ulcer





Current Medications


Current Medications





Current Medications


Sodium Chloride 500 ml @  500 mls/hr 1X  ONCE IV  Last administered on 8/18/20at

21:43;  Start 8/18/20 at 21:30;  Stop 8/18/20 at 22:29;  Status DC


Vancomycin HCl (Vanco Per Pharmacy) 1 each PRN DAILY  PRN MC SEE COMMENTS Last 

administered on 8/19/20at 00:20;  Start 8/18/20 at 21:00


Piperacillin Sod/ Tazobactam Sod 4.5 gm/Sodium Chloride 100 ml @  200 mls/hr 1X 

ONCE IV  Last administered on 8/18/20at 21:32;  Start 8/18/20 at 21:30;  Stop 

8/18/20 at 21:59;  Status DC


Vancomycin HCl 2 gm/Sodium Chloride 500 ml @  250 mls/hr 1X  ONCE IV  Last 

administered on 8/18/20at 21:32;  Start 8/18/20 at 22:00;  Stop 8/18/20 at 

23:59;  Status DC


Piperacillin Sod/ Tazobactam Sod 4.5 gm/Sodium Chloride 100 ml @  200 mls/hr 

Q6HRS IV ;  Start 8/19/20 at 00:00;  Status Cancel


Hydromorphone HCl (Dilaudid) 1 mg 1X  ONCE IV  Last administered on 8/19/20at 

00:16;  Start 8/19/20 at 00:00;  Stop 8/19/20 at 00:01;  Status DC


Hydromorphone HCl (Dilaudid) 0.5 mg PRN Q4HRS  PRN IV SEVERE PAIN 7-10;  Start 

8/18/20 at 23:45


Piperacillin Sod/ Tazobactam Sod 4.5 gm/Sodium Chloride 100 ml @  200 mls/hr 

Q6HRS IV  Last administered on 8/19/20at 05:58;  Start 8/19/20 at 02:00


Vancomycin HCl 1.5 gm/Sodium Chloride 500 ml @  250 mls/hr Q8HRS IV  Last admin

istered on 8/19/20at 06:43;  Start 8/19/20 at 06:00


Vancomycin HCl (Vancomycin Trough Level) 1 each 1X  ONCE MC ;  Start 8/19/20 at 

21:30;  Stop 8/19/20 at 21:31





Active Scripts


Active


Naproxen 500 Mg Tablet 1 Tab PO BID PRN


Percocet  Mg Tablet ** (Oxycodone/Acetaminophen) 1 Each Tablet 1 Tab PO 

PRN Q6HRS PRN 14 Days


Colace (Docusate Sodium) 100 Mg Capsule 100 Mg PO PRN BID PRN 30 Days


Reported


Norco 5-325 Tablet (Acetaminophen/Hydrocodone Bitart) 1 Each Tablet 1 Tab PO PRN

BID PRN


Lantus Solostar (Insulin Glargine,Hum.rec.anlog) 100 Unit/1 Ml Insuln.pen 20 

Unit SQ QHS


Gabapentin ** (Gabapentin) 300 Mg Capsule 300 Mg PO TID


Janumet 50-1,000 Mg Tablet (Sitagliptin Phos/Metformin Hcl) 1 Each Tablet 1 Tab 

PO BID


Multivitamins (Multivitamin) 1 Each Tablet 1 Tab PO DAILY


Metoprolol Tartrate 25 Mg Tablet 1 Tab PO BID


Livalo (Pitavastatin Calcium) 4 Mg Tablet 4 Mg PO 


Aspirin 81 Mg Tab.chew 1 Tab PO DAILY


Iron Supplement (Ferrous Sulfate) 325 Mg Tablet 65 Mg PO 


Proventil Hfa Inhaler (Albuterol Sulfate) 6.7 Gm Hfa.aer.ad 2 Puff IH BID


Lisinopril-Hctz 20-25 Mg Tab (Lisinopril/Hydrochlorothiazide) 1 Each Tablet 1 

Tab PO DAILY


Lovastatin 20 Mg Tablet 10 Mg PO HS


Symbicort 160-4.5 Mcg Inhaler (Budesonide/Formoterol Fumarate) 10.2 Gm 

Hfa.aer.ad 2 Puff IH BID





Allergies


Allergies:  


Coded Allergies:  


     No Known Drug Allergies (Unverified , 12/2/18)





ROS


General:  No: Chills, Fatigue


PSYCHOLOGICAL ROS:  No: Anxiety, Depression


Eyes:  No Blurry vision, No Loss of vision


HEENT:  No: Heacaches, Visual Changes, Hearing change, Epistaxis


Respiratory:  No: Cough, Pleuritic Pain, Shortness of breath


Cardiovascular:  No Chest Pain, No Palpitations, No Paroxysmal Noc. Dyspnea


Gastrointestinal:  No Nausea, No Vomiting


Musculoskeletal:  Yes Joint Pain, Yes Pain In: (Left 1st toe); 


   No Joint Swelling


Neurological:  Yes Numbness/Tingling (Bilateral feet); 


   No Visual Changes


Skin:  Yes Other (Blister to left 1st toe)





Physical Exam


General:  Alert, Oriented X3, Cooperative, No acute distress


HEENT:  PERRLA, Mucous membr. moist/pink


Lungs:  Clear to auscultation, Normal air movement


Heart:  S1S2, RRR


Cardiovascular:  S1, S2


Abdomen:  Normal bowel sounds, Other (Obese)


Extremities:  Normal pulses, Other (+1 edema bilaterally)


Skin:  Other (~2x3 cm superficial ulceration to lateral left big toe, minimal 

surounding erythema.)


Neuro:  Other (Diminished senation to bilateral feet)


Psych/Mental Status:  Mental status NL, Mood NL





Vitals


Vitals





Vital Signs








  Date Time  Temp Pulse Resp B/P (MAP) Pulse Ox O2 Delivery O2 Flow Rate FiO2


 


8/19/20 03:00   20   Room Air  


 


8/19/20 00:47 98.4 98  114/73 (87) 94   





 98.4       











Labs


Labs





Laboratory Tests








Test


 8/18/20


21:57 8/18/20


22:35


 


White Blood Count


 8.0 x10^3/uL


(4.0-11.0) 





 


Red Blood Count


 3.76 x10^6/uL


(4.30-5.70) 





 


Hemoglobin


 10.8 g/dL


(13.0-17.5) 





 


Hematocrit


 32.7 %


(39.0-53.0) 





 


Mean Corpuscular Volume 87 fL ()  


 


Mean Corpuscular Hemoglobin 29 pg (25-35)  


 


Mean Corpuscular Hemoglobin


Concent 33 g/dL


(31-37) 





 


Red Cell Distribution Width


 16.8 %


(11.5-14.5) 





 


Platelet Count


 245 x10^3/uL


(140-400) 





 


Neutrophils (%) (Auto) 67 % (31-73)  


 


Lymphocytes (%) (Auto) 22 % (24-48)  


 


Monocytes (%) (Auto) 5 % (0-9)  


 


Eosinophils (%) (Auto) 5 % (0-3)  


 


Basophils (%) (Auto) 1 % (0-3)  


 


Neutrophils # (Auto)


 5.3 x10^3/uL


(1.8-7.7) 





 


Lymphocytes # (Auto)


 1.7 x10^3/uL


(1.0-4.8) 





 


Monocytes # (Auto)


 0.4 x10^3/uL


(0.0-1.1) 





 


Eosinophils # (Auto)


 0.4 x10^3/uL


(0.0-0.7) 





 


Basophils # (Auto)


 0.1 x10^3/uL


(0.0-0.2) 





 


Prothrombin Time


 13.8 SEC


(11.7-14.0) 





 


Prothromb Time International


Ratio 1.1 (0.8-1.1) 


 





 


Sodium Level


 136 mmol/L


(136-145) 





 


Potassium Level


 4.1 mmol/L


(3.5-5.1) 





 


Chloride Level


 99 mmol/L


() 





 


Carbon Dioxide Level


 31 mmol/L


(21-32) 





 


Anion Gap 6 (6-14)  


 


Blood Urea Nitrogen


 23 mg/dL


(8-26) 





 


Creatinine


 1.2 mg/dL


(0.7-1.3) 





 


Estimated GFR


(Cockcroft-Gault) 64.9 


 





 


BUN/Creatinine Ratio 19 (6-20)  


 


Glucose Level


 265 mg/dL


(70-99) 





 


Lactic Acid Level


 2.2 mmol/L


(0.4-2.0) 





 


Calcium Level


 9.3 mg/dL


(8.5-10.1) 





 


Total Bilirubin


 0.3 mg/dL


(0.2-1.0) 





 


Aspartate Amino Transf


(AST/SGOT) 12 U/L (15-37) 


 





 


Alanine Aminotransferase


(ALT/SGPT) 20 U/L (16-63) 


 





 


Alkaline Phosphatase


 79 U/L


() 





 


Total Protein


 7.9 g/dL


(6.4-8.2) 





 


Albumin


 3.0 g/dL


(3.4-5.0) 





 


Albumin/Globulin Ratio 0.6 (1.0-1.7)  


 


Urine Collection Type  Unknown 


 


Urine Color  Yellow 


 


Urine Clarity  Clear 


 


Urine pH  7.5 (<5.0-8.0) 


 


Urine Specific Gravity


 


 1.020


(1.000-1.030)


 


Urine Protein


 


 Negative mg/dL


(NEG-TRACE)


 


Urine Glucose (UA)


 


 100 mg/dL


(NEG)


 


Urine Ketones (Stick)


 


 Negative mg/dL


(NEG)


 


Urine Blood  Negative (NEG) 


 


Urine Nitrite  Negative (NEG) 


 


Urine Bilirubin  Negative (NEG) 


 


Urine Urobilinogen Dipstick


 


 0.2 mg/dL (0.2


mg/dL)


 


Urine Leukocyte Esterase  Negative (NEG) 


 


Urine RBC  0 /HPF (0-2) 


 


Urine WBC  0 /HPF (0-4) 


 


Urine Squamous Epithelial


Cells 


 Few /LPF 





 


Urine Bacteria  0 /HPF (0-FEW) 








Laboratory Tests








Test


 8/18/20


21:57 8/18/20


22:35


 


White Blood Count


 8.0 x10^3/uL


(4.0-11.0) 





 


Red Blood Count


 3.76 x10^6/uL


(4.30-5.70) 





 


Hemoglobin


 10.8 g/dL


(13.0-17.5) 





 


Hematocrit


 32.7 %


(39.0-53.0) 





 


Mean Corpuscular Volume 87 fL ()  


 


Mean Corpuscular Hemoglobin 29 pg (25-35)  


 


Mean Corpuscular Hemoglobin


Concent 33 g/dL


(31-37) 





 


Red Cell Distribution Width


 16.8 %


(11.5-14.5) 





 


Platelet Count


 245 x10^3/uL


(140-400) 





 


Neutrophils (%) (Auto) 67 % (31-73)  


 


Lymphocytes (%) (Auto) 22 % (24-48)  


 


Monocytes (%) (Auto) 5 % (0-9)  


 


Eosinophils (%) (Auto) 5 % (0-3)  


 


Basophils (%) (Auto) 1 % (0-3)  


 


Neutrophils # (Auto)


 5.3 x10^3/uL


(1.8-7.7) 





 


Lymphocytes # (Auto)


 1.7 x10^3/uL


(1.0-4.8) 





 


Monocytes # (Auto)


 0.4 x10^3/uL


(0.0-1.1) 





 


Eosinophils # (Auto)


 0.4 x10^3/uL


(0.0-0.7) 





 


Basophils # (Auto)


 0.1 x10^3/uL


(0.0-0.2) 





 


Prothrombin Time


 13.8 SEC


(11.7-14.0) 





 


Prothromb Time International


Ratio 1.1 (0.8-1.1) 


 





 


Sodium Level


 136 mmol/L


(136-145) 





 


Potassium Level


 4.1 mmol/L


(3.5-5.1) 





 


Chloride Level


 99 mmol/L


() 





 


Carbon Dioxide Level


 31 mmol/L


(21-32) 





 


Anion Gap 6 (6-14)  


 


Blood Urea Nitrogen


 23 mg/dL


(8-26) 





 


Creatinine


 1.2 mg/dL


(0.7-1.3) 





 


Estimated GFR


(Cockcroft-Gault) 64.9 


 





 


BUN/Creatinine Ratio 19 (6-20)  


 


Glucose Level


 265 mg/dL


(70-99) 





 


Lactic Acid Level


 2.2 mmol/L


(0.4-2.0) 





 


Calcium Level


 9.3 mg/dL


(8.5-10.1) 





 


Total Bilirubin


 0.3 mg/dL


(0.2-1.0) 





 


Aspartate Amino Transf


(AST/SGOT) 12 U/L (15-37) 


 





 


Alanine Aminotransferase


(ALT/SGPT) 20 U/L (16-63) 


 





 


Alkaline Phosphatase


 79 U/L


() 





 


Total Protein


 7.9 g/dL


(6.4-8.2) 





 


Albumin


 3.0 g/dL


(3.4-5.0) 





 


Albumin/Globulin Ratio 0.6 (1.0-1.7)  


 


Urine Collection Type  Unknown 


 


Urine Color  Yellow 


 


Urine Clarity  Clear 


 


Urine pH  7.5 (<5.0-8.0) 


 


Urine Specific Gravity


 


 1.020


(1.000-1.030)


 


Urine Protein


 


 Negative mg/dL


(NEG-TRACE)


 


Urine Glucose (UA)


 


 100 mg/dL


(NEG)


 


Urine Ketones (Stick)


 


 Negative mg/dL


(NEG)


 


Urine Blood  Negative (NEG) 


 


Urine Nitrite  Negative (NEG) 


 


Urine Bilirubin  Negative (NEG) 


 


Urine Urobilinogen Dipstick


 


 0.2 mg/dL (0.2


mg/dL)


 


Urine Leukocyte Esterase  Negative (NEG) 


 


Urine RBC  0 /HPF (0-2) 


 


Urine WBC  0 /HPF (0-4) 


 


Urine Squamous Epithelial


Cells 


 Few /LPF 





 


Urine Bacteria  0 /HPF (0-FEW) 











VTE Prophylaxis Ordered


VTE Prophylaxis Devices:  No


VTE Pharmacological Prophylaxi:  Yes





Assessment/Plan


Assessment/Plan


Diabetic foot ulcer and type 2 diabetes: We will treat empirically with 

vancomycin and Zosyn.  Consults to wound care physician and wound care nurse.  

We will obtain arterial duplex to evaluate patient's blood supply.  Patient 

superficial ulcer can likely be managed with wound care and switch to oral 

antibiotics, barring normal blood flow.  Patient states he has a cardiology 

appointment on Friday August 21, that if at all possible he would like to make.





Justicifation of Admission Dx:


Justifications for Admission:


Justification of Admission Dx:  Yes





Problem Qualifiers





(1) Diabetic foot ulcer:  


Diabetic foot ulcer location:  toe  Diabetes mellitus type:  type 2  Laterality:

 left








MARY LOU MO MD            Aug 19, 2020 07:58

## 2020-08-19 NOTE — RAD
INDICATION: Reason: CONCERN FOR OSTEOMYELITIS / Spl. Instructions:  / 

History: , Soft tissue swelling

 

COMPARISON: None.

 

IMPRESSION: 

 

Left foot: 3 views obtained. Soft tissue swelling is identified at the 

first digit distally with apparent soft tissue defect within the region 

which could be secondary to an ulcer if the patient has evidence of ulcer 

in the region. Plantar calcaneal spur. No definite acute fracture or 

dislocation. If there is high clinical concern for osteomyelitis MRI or 

bone scan could better assess given the limits of plain film.

 

Electronically signed by: Ben Hurley MD (8/19/2020 12:22 AM) 

[id number]

## 2020-08-19 NOTE — NUR
ADMIT NOTE

The patient, CHRISTOPHER GOOD, 46 y/o, M admitted by SRAVAN HOBBS MD, was given 
written information regarding hospital policies, unit procedures and contact persons.  

Patient orientated to room, plan of care reviewed and admit packet discussed.

Valuables were checked and left in room with patient.

Patient now in chair, call light within reach and no other needs voiced at this time.

## 2020-08-19 NOTE — NUR
SW following. Discussed with RN, pt from home, room air, ada diet, gets around fine. Wound 
care consulted. SW will continue to follow.

## 2020-08-19 NOTE — RAD
EXAM: Left lower extremity arterial Doppler.

 

HISTORY: Nonhealing left toe ulcer.

 

COMPARISON: None.

 

FINDINGS: Grayscale and Doppler analysis of the left lower extremity 

arterial system was performed.

 

There are triphasic waveforms throughout the left lower extremity. There 

are no elevated peak systolic velocities suggestive of focal stenosis. The

dorsalis pedis artery is patent. The peroneal artery is not visualized and

may be diminutive or occluded.

 

IMPRESSION: 

1. The peroneal artery may be diminutive or occluded. Otherwise, no 

evidence of hemodynamically significant stenosis is identified.

 

Electronically signed by: SHELBY Sesay MD (8/19/2020 2:08 PM) 

[id number]

## 2020-08-19 NOTE — PDOC2
CONSULT


Date of Consult


Date of Consult


DATE: 8/19/20 


TIME: 15:36





Reason for Consult


Reason for Consult:


Left great toe DFU with cellulitis





History of Present Illness


Reason for Visit:


Pt states he noticed a blister to his left great toe 5 days ago.  The blister 

ruptured 4 days ago and drained clear fluid.  Patient states that shortly 

thereafter he began to notice swelling and redness of his left lower leg.  

Patient states that he has had diabetes for 4 years and was originally diagnosed

with a hemoglobin A1c of 18.  Patient states that his most recent hemoglobin A1c

was 5.6.  Patient states that he has had neuropathy of his bilateral feet and 

hands for multiple years prior to the diagnosis of diabetes.  Patient denies 

history of other diabetic foot ulcers or difficulty with wound healing.  Patient

denies systemic symptoms including fever or body aches.





Past Medical History


Cardiovascular:  HTN, Hyperlipidemia


Pulmonary:  COPD


CENTRAL NERVOUS SYSTEM:  Periperal neuropathy


Hepatobiliary:  No pertinent hx


Musculoskeletal:   low back pain


Infectious disease:  No pertinent hx


Endocrine:  Diabetes





Past Surgical History


Past Surgical History:  No pertinent history





Family History


Family History:  Hypertension





Social History


No


ALCOHOL:  occassional


Drugs:  None





Current Problem List


Problem List


Left great toe DFU with associated cellulitis





Current Medications


Current Medications





Current Medications


Sodium Chloride 500 ml @  500 mls/hr 1X  ONCE IV  Last administered on 8/18/20at

21:43;  Start 8/18/20 at 21:30;  Stop 8/18/20 at 22:29;  Status DC


Vancomycin HCl (Vanco Per Pharmacy) 1 each PRN DAILY  PRN MC SEE COMMENTS Last 

administered on 8/19/20at 00:20;  Start 8/18/20 at 21:00;  Stop 8/19/20 at 

09:37;  Status DC


Piperacillin Sod/ Tazobactam Sod 4.5 gm/Sodium Chloride 100 ml @  200 mls/hr 1X 

ONCE IV  Last administered on 8/18/20at 21:32;  Start 8/18/20 at 21:30;  Stop 

8/18/20 at 21:59;  Status DC


Vancomycin HCl 2 gm/Sodium Chloride 500 ml @  250 mls/hr 1X  ONCE IV  Last 

administered on 8/18/20at 21:32;  Start 8/18/20 at 22:00;  Stop 8/18/20 at 

23:59;  Status DC


Piperacillin Sod/ Tazobactam Sod 4.5 gm/Sodium Chloride 100 ml @  200 mls/hr 

Q6HRS IV ;  Start 8/19/20 at 00:00;  Status Cancel


Hydromorphone HCl (Dilaudid) 1 mg 1X  ONCE IV  Last administered on 8/19/20at 

00:16;  Start 8/19/20 at 00:00;  Stop 8/19/20 at 00:01;  Status DC


Hydromorphone HCl (Dilaudid) 0.5 mg PRN Q4HRS  PRN IV SEVERE PAIN 7-10 Last 

administered on 8/19/20at 08:50;  Start 8/18/20 at 23:45


Piperacillin Sod/ Tazobactam Sod 4.5 gm/Sodium Chloride 100 ml @  200 mls/hr 

Q6HRS IV  Last administered on 8/19/20at 12:17;  Start 8/19/20 at 02:00


Vancomycin HCl 1.5 gm/Sodium Chloride 500 ml @  250 mls/hr Q8HRS IV  Last 

administered on 8/19/20at 06:43;  Start 8/19/20 at 06:00;  Stop 8/19/20 at 

09:37;  Status DC


Vancomycin HCl (Vancomycin Trough Level) 1 each 1X  ONCE MC ;  Start 8/19/20 at 

21:30;  Stop 8/19/20 at 09:37;  Status DC


Sodium Chloride 1,000 ml @  125 mls/hr 1X  ONCE IV  Last administered on 

8/19/20at 08:50;  Start 8/19/20 at 07:45;  Stop 8/19/20 at 15:44


Aspirin (Aspirin Chewable) 81 mg DAILY PO  Last administered on 8/19/20at 10:41;

 Start 8/19/20 at 12:00


Docusate Sodium (Colace) 100 mg PRN BID  PRN PO CONSTIPATION;  Start 8/19/20 at 

10:15


Gabapentin (Neurontin) 300 mg TID PO  Last administered on 8/19/20at 14:37;  

Start 8/19/20 at 14:00


Metoprolol Tartrate (Lopressor) 25 mg BID PO  Last administered on 8/19/20at 

10:42;  Start 8/19/20 at 11:00


Oxycodone/ Acetaminophen (Percocet 10/325) 1 tab PRN Q6HRS  PRN PO MODERATE TO 

SEVERE PAIN Last administered on 8/19/20at 12:16;  Start 8/19/20 at 10:15


Non-Formulary Medication (Albuterol Sulfate (Proventil Hfa Inhaler)) 2 puff BID 

IH ;  Start 8/19/20 at 21:00;  Status UNV


Non-Formulary Medication (Budesonide/ Formoterol Fumarate (Symbicort 160-4.5 Mcg

Inhaler)) 2 puff BID IH ;  Start 8/19/20 at 21:00;  Status UNV


Lisinopril (Prinivil) 20 mg DAILY PO  Last administered on 8/19/20at 10:41;  

Start 8/19/20 at 11:00


Atorvastatin Calcium (Lipitor) 5 mg QHS PO ;  Start 8/19/20 at 21:00


Multivitamins (Thera M Plus) 1 tab DAILY PO  Last administered on 8/19/20at 

10:41;  Start 8/19/20 at 09:00


Lactobacillus Rhamnosus (Culturelle) 1 cap BID PO ;  Start 8/19/20 at 21:00


Insulin Glargine (Lantus Syringe) 25 unit QHS SQ ;  Start 8/19/20 at 21:00


Insulin Human Lispro (HumaLOG) 0-7 UNITS TIDWMEALS SQ  Last administered on 

8/19/20at 12:20;  Start 8/19/20 at 12:00


Dextrose (Dextrose 50%-Water Syringe) 12.5 gm PRN Q15MIN  PRN IV SEE COMMENTS;  

Start 8/19/20 at 10:30


Insulin Human Lispro (HumaLOG) 8 units TIDWMEALS SQ  Last administered on 

8/19/20at 12:21;  Start 8/19/20 at 12:00


Hydrochlorothiazide (Hydrodiuril) 25 mg DAILY PO  Last administered on 8/19/20at

10:41;  Start 8/19/20 at 11:00


Albuterol Sulfate (Ventolin Neb Soln) 2.5 mg Q6HRS NEB  Last administered on 

8/19/20at 15:28;  Start 8/19/20 at 12:00


Budesonide (Pulmicort) 0.5 mg RTBID NEB  Last administered on 8/19/20at 15:28;  

Start 8/19/20 at 11:00


Oxycodone HCl (Roxicodone) 10 mg PRN Q4HRS  PRN PO MODERATE PAIN;  Start 8/19/20

at 15:00


Morphine Sulfate (Morphine Sulfate) 4 mg PRN Q4HRS  PRN IV MODERATE TO SEVERE 

PAIN;  Start 8/19/20 at 15:00





Active Scripts


Active


Naproxen 500 Mg Tablet 1 Tab PO BID PRN


Percocet  Mg Tablet ** (Oxycodone/Acetaminophen) 1 Each Tablet 1 Tab PO 

PRN Q6HRS PRN 14 Days


Colace (Docusate Sodium) 100 Mg Capsule 100 Mg PO PRN BID PRN 30 Days


Reported


Norco 5-325 Tablet (Acetaminophen/Hydrocodone Bitart) 1 Each Tablet 1 Tab PO PRN

BID PRN


Lantus Solostar (Insulin Glargine,Hum.rec.anlog) 100 Unit/1 Ml Insuln.pen 20 

Unit SQ QHS


Gabapentin ** (Gabapentin) 300 Mg Capsule 300 Mg PO TID


Janumet 50-1,000 Mg Tablet (Sitagliptin Phos/Metformin Hcl) 1 Each Tablet 1 Tab 

PO BID


Multivitamins (Multivitamin) 1 Each Tablet 1 Tab PO DAILY


Metoprolol Tartrate 25 Mg Tablet 1 Tab PO BID


Livalo (Pitavastatin Calcium) 4 Mg Tablet 4 Mg PO 


Aspirin 81 Mg Tab.chew 1 Tab PO DAILY


Iron Supplement (Ferrous Sulfate) 325 Mg Tablet 65 Mg PO 


Proventil Hfa Inhaler (Albuterol Sulfate) 6.7 Gm Hfa.aer.ad 2 Puff IH BID


Lisinopril-Hctz 20-25 Mg Tab (Lisinopril/Hydrochlorothiazide) 1 Each Tablet 1 

Tab PO DAILY


Lovastatin 20 Mg Tablet 10 Mg PO HS


Symbicort 160-4.5 Mcg Inhaler (Budesonide/Formoterol Fumarate) 10.2 Gm 

Hfa.aer.ad 2 Puff IH BID





Allergies


Allergies:  


Coded Allergies:  


     No Known Drug Allergies (Unverified , 12/2/18)





ROS


Review of System


Patient states that he is feeling well.  Patient states that he does have 

generalized pain associated with his osteoarthritis.  Patient states that he 

typically uses hydrocodone 10/325 2 times daily to manage his pain.  Patient 

states that he uses Celebrex to manage his nerve pain in his hands and feet.  

Patient denies cough or shortness of breath.  Patient states that he has a good 

appetite and has been eating and drinking well without nausea, vomiting or 

diarrhea.  Patient states that he has been sleeping okay and has not experienced

any mood changes.





Physical Exam


Physical Exam


Patient awake and alert 45-year-old  male in no apparent distress.  

Patient is oriented to person, place and time and is pleasant in conversation.  

Patient is a good historian.  Respirations are even and unlabored.  Patient is 

on room air not requiring supplemental oxygen.  Body habitus is obese.  Abdomen 

is soft, obese, nondistended and nontender to palpation.  Skin is warm, dry and 

pink.  The left lower extremity does reveal increased erythema with mild warmth 

to touch.  The left great toe, lateral aspect has a 3 x 2 x 0.1 cm open ul

ceration.  Surrounding tissue with callus formation and loose epithelial tissue.

 Callus and nonviable tissue removed with sterile scissors and sterile curette. 

No bleeding occurred with procedure and patient denied pain.  Wound bed is 100% 

granulation.  There is moderate serosanguineous drainage on the previous 

dressing.  No odor following cleansing.





Vitals


VITALS





Vital Signs








  Date Time  Temp Pulse Resp B/P (MAP) Pulse Ox O2 Delivery O2 Flow Rate FiO2


 


8/19/20 15:30     97 Room Air  


 


8/19/20 11:00 98.4 80 18 143/69 (93)    





 98.4       











Labs


Labs





Laboratory Tests








Test


 8/18/20


21:57 8/18/20


22:35 8/19/20


07:43 8/19/20


09:10


 


White Blood Count


 8.0 x10^3/uL


(4.0-11.0) 


 


 6.2 x10^3/uL


(4.0-11.0)


 


Red Blood Count


 3.76 x10^6/uL


(4.30-5.70) 


 


 3.83 x10^6/uL


(4.30-5.70)


 


Hemoglobin


 10.8 g/dL


(13.0-17.5) 


 


 10.9 g/dL


(13.0-17.5)


 


Hematocrit


 32.7 %


(39.0-53.0) 


 


 33.5 %


(39.0-53.0)


 


Mean Corpuscular Volume 87 fL ()    87 fL () 


 


Mean Corpuscular Hemoglobin 29 pg (25-35)    28 pg (25-35) 


 


Mean Corpuscular Hemoglobin


Concent 33 g/dL


(31-37) 


 


 33 g/dL


(31-37)


 


Red Cell Distribution Width


 16.8 %


(11.5-14.5) 


 


 17.2 %


(11.5-14.5)


 


Platelet Count


 245 x10^3/uL


(140-400) 


 


 216 x10^3/uL


(140-400)


 


Neutrophils (%) (Auto) 67 % (31-73)    74 % (31-73) 


 


Lymphocytes (%) (Auto) 22 % (24-48)    16 % (24-48) 


 


Monocytes (%) (Auto) 5 % (0-9)    6 % (0-9) 


 


Eosinophils (%) (Auto) 5 % (0-3)    5 % (0-3) 


 


Basophils (%) (Auto) 1 % (0-3)    0 % (0-3) 


 


Neutrophils # (Auto)


 5.3 x10^3/uL


(1.8-7.7) 


 


 4.5 x10^3/uL


(1.8-7.7)


 


Lymphocytes # (Auto)


 1.7 x10^3/uL


(1.0-4.8) 


 


 1.0 x10^3/uL


(1.0-4.8)


 


Monocytes # (Auto)


 0.4 x10^3/uL


(0.0-1.1) 


 


 0.4 x10^3/uL


(0.0-1.1)


 


Eosinophils # (Auto)


 0.4 x10^3/uL


(0.0-0.7) 


 


 0.3 x10^3/uL


(0.0-0.7)


 


Basophils # (Auto)


 0.1 x10^3/uL


(0.0-0.2) 


 


 0.0 x10^3/uL


(0.0-0.2)


 


Prothrombin Time


 13.8 SEC


(11.7-14.0) 


 


 





 


Prothromb Time International


Ratio 1.1 (0.8-1.1) 


 


 


 





 


Sodium Level


 136 mmol/L


(136-145) 


 


 138 mmol/L


(136-145)


 


Potassium Level


 4.1 mmol/L


(3.5-5.1) 


 


 4.3 mmol/L


(3.5-5.1)


 


Chloride Level


 99 mmol/L


() 


 


 99 mmol/L


()


 


Carbon Dioxide Level


 31 mmol/L


(21-32) 


 


 33 mmol/L


(21-32)


 


Anion Gap 6 (6-14)    6 (6-14) 


 


Blood Urea Nitrogen


 23 mg/dL


(8-26) 


 


 19 mg/dL


(8-26)


 


Creatinine


 1.2 mg/dL


(0.7-1.3) 


 


 1.0 mg/dL


(0.7-1.3)


 


Estimated GFR


(Cockcroft-Gault) 64.9 


 


 


 80.1 





 


BUN/Creatinine Ratio 19 (6-20)    


 


Glucose Level


 265 mg/dL


(70-99) 


 


 226 mg/dL


(70-99)


 


Lactic Acid Level


 2.2 mmol/L


(0.4-2.0) 


 


 1.5 mmol/L


(0.4-2.0)


 


Calcium Level


 9.3 mg/dL


(8.5-10.1) 


 


 8.8 mg/dL


(8.5-10.1)


 


Total Bilirubin


 0.3 mg/dL


(0.2-1.0) 


 


 





 


Aspartate Amino Transf


(AST/SGOT) 12 U/L (15-37) 


 


 


 





 


Alanine Aminotransferase


(ALT/SGPT) 20 U/L (16-63) 


 


 


 





 


Alkaline Phosphatase


 79 U/L


() 


 


 





 


Total Protein


 7.9 g/dL


(6.4-8.2) 


 


 





 


Albumin


 3.0 g/dL


(3.4-5.0) 


 


 





 


Albumin/Globulin Ratio 0.6 (1.0-1.7)    


 


Urine Collection Type  Unknown   


 


Urine Color  Yellow   


 


Urine Clarity  Clear   


 


Urine pH  7.5 (<5.0-8.0)   


 


Urine Specific Gravity


 


 1.020


(1.000-1.030) 


 





 


Urine Protein


 


 Negative mg/dL


(NEG-TRACE) 


 





 


Urine Glucose (UA)


 


 100 mg/dL


(NEG) 


 





 


Urine Ketones (Stick)


 


 Negative mg/dL


(NEG) 


 





 


Urine Blood  Negative (NEG)   


 


Urine Nitrite  Negative (NEG)   


 


Urine Bilirubin  Negative (NEG)   


 


Urine Urobilinogen Dipstick


 


 0.2 mg/dL (0.2


mg/dL) 


 





 


Urine Leukocyte Esterase  Negative (NEG)   


 


Urine RBC  0 /HPF (0-2)   


 


Urine WBC  0 /HPF (0-4)   


 


Urine Squamous Epithelial


Cells 


 Few /LPF 


 


 





 


Urine Bacteria  0 /HPF (0-FEW)   


 


Glucose (Fingerstick)


 


 


 168 mg/dL


(70-99) 





 


Test


 8/19/20


11:38 


 


 





 


Glucose (Fingerstick)


 198 mg/dL


(70-99) 


 


 











Laboratory Tests








Test


 8/18/20


21:57 8/18/20


22:35 8/19/20


07:43 8/19/20


09:10


 


White Blood Count


 8.0 x10^3/uL


(4.0-11.0) 


 


 6.2 x10^3/uL


(4.0-11.0)


 


Red Blood Count


 3.76 x10^6/uL


(4.30-5.70) 


 


 3.83 x10^6/uL


(4.30-5.70)


 


Hemoglobin


 10.8 g/dL


(13.0-17.5) 


 


 10.9 g/dL


(13.0-17.5)


 


Hematocrit


 32.7 %


(39.0-53.0) 


 


 33.5 %


(39.0-53.0)


 


Mean Corpuscular Volume 87 fL ()    87 fL () 


 


Mean Corpuscular Hemoglobin 29 pg (25-35)    28 pg (25-35) 


 


Mean Corpuscular Hemoglobin


Concent 33 g/dL


(31-37) 


 


 33 g/dL


(31-37)


 


Red Cell Distribution Width


 16.8 %


(11.5-14.5) 


 


 17.2 %


(11.5-14.5)


 


Platelet Count


 245 x10^3/uL


(140-400) 


 


 216 x10^3/uL


(140-400)


 


Neutrophils (%) (Auto) 67 % (31-73)    74 % (31-73) 


 


Lymphocytes (%) (Auto) 22 % (24-48)    16 % (24-48) 


 


Monocytes (%) (Auto) 5 % (0-9)    6 % (0-9) 


 


Eosinophils (%) (Auto) 5 % (0-3)    5 % (0-3) 


 


Basophils (%) (Auto) 1 % (0-3)    0 % (0-3) 


 


Neutrophils # (Auto)


 5.3 x10^3/uL


(1.8-7.7) 


 


 4.5 x10^3/uL


(1.8-7.7)


 


Lymphocytes # (Auto)


 1.7 x10^3/uL


(1.0-4.8) 


 


 1.0 x10^3/uL


(1.0-4.8)


 


Monocytes # (Auto)


 0.4 x10^3/uL


(0.0-1.1) 


 


 0.4 x10^3/uL


(0.0-1.1)


 


Eosinophils # (Auto)


 0.4 x10^3/uL


(0.0-0.7) 


 


 0.3 x10^3/uL


(0.0-0.7)


 


Basophils # (Auto)


 0.1 x10^3/uL


(0.0-0.2) 


 


 0.0 x10^3/uL


(0.0-0.2)


 


Prothrombin Time


 13.8 SEC


(11.7-14.0) 


 


 





 


Prothromb Time International


Ratio 1.1 (0.8-1.1) 


 


 


 





 


Sodium Level


 136 mmol/L


(136-145) 


 


 138 mmol/L


(136-145)


 


Potassium Level


 4.1 mmol/L


(3.5-5.1) 


 


 4.3 mmol/L


(3.5-5.1)


 


Chloride Level


 99 mmol/L


() 


 


 99 mmol/L


()


 


Carbon Dioxide Level


 31 mmol/L


(21-32) 


 


 33 mmol/L


(21-32)


 


Anion Gap 6 (6-14)    6 (6-14) 


 


Blood Urea Nitrogen


 23 mg/dL


(8-26) 


 


 19 mg/dL


(8-26)


 


Creatinine


 1.2 mg/dL


(0.7-1.3) 


 


 1.0 mg/dL


(0.7-1.3)


 


Estimated GFR


(Cockcroft-Gault) 64.9 


 


 


 80.1 





 


BUN/Creatinine Ratio 19 (6-20)    


 


Glucose Level


 265 mg/dL


(70-99) 


 


 226 mg/dL


(70-99)


 


Lactic Acid Level


 2.2 mmol/L


(0.4-2.0) 


 


 1.5 mmol/L


(0.4-2.0)


 


Calcium Level


 9.3 mg/dL


(8.5-10.1) 


 


 8.8 mg/dL


(8.5-10.1)


 


Total Bilirubin


 0.3 mg/dL


(0.2-1.0) 


 


 





 


Aspartate Amino Transf


(AST/SGOT) 12 U/L (15-37) 


 


 


 





 


Alanine Aminotransferase


(ALT/SGPT) 20 U/L (16-63) 


 


 


 





 


Alkaline Phosphatase


 79 U/L


() 


 


 





 


Total Protein


 7.9 g/dL


(6.4-8.2) 


 


 





 


Albumin


 3.0 g/dL


(3.4-5.0) 


 


 





 


Albumin/Globulin Ratio 0.6 (1.0-1.7)    


 


Urine Collection Type  Unknown   


 


Urine Color  Yellow   


 


Urine Clarity  Clear   


 


Urine pH  7.5 (<5.0-8.0)   


 


Urine Specific Gravity


 


 1.020


(1.000-1.030) 


 





 


Urine Protein


 


 Negative mg/dL


(NEG-TRACE) 


 





 


Urine Glucose (UA)


 


 100 mg/dL


(NEG) 


 





 


Urine Ketones (Stick)


 


 Negative mg/dL


(NEG) 


 





 


Urine Blood  Negative (NEG)   


 


Urine Nitrite  Negative (NEG)   


 


Urine Bilirubin  Negative (NEG)   


 


Urine Urobilinogen Dipstick


 


 0.2 mg/dL (0.2


mg/dL) 


 





 


Urine Leukocyte Esterase  Negative (NEG)   


 


Urine RBC  0 /HPF (0-2)   


 


Urine WBC  0 /HPF (0-4)   


 


Urine Squamous Epithelial


Cells 


 Few /LPF 


 


 





 


Urine Bacteria  0 /HPF (0-FEW)   


 


Glucose (Fingerstick)


 


 


 168 mg/dL


(70-99) 





 


Test


 8/19/20


11:38 


 


 





 


Glucose (Fingerstick)


 198 mg/dL


(70-99) 


 


 














Assessment/Plan


Assessment/Plan


Left great toe DFU with associated cellulitis with no evidence of PAD. 


 -Callus and loose epithelial removed at bedside.  No bleeding occurred and 

patient did not have painful symptoms.


 -Patient currently on vancomycin and Zosyn per Primary


 -Arterial Doppler done on 8/19/2020 revealed "triphasic waveforms throughout 

the left lower extremity.  The dorsalis pedis artery is patent.  The peroneal a

rtery is not visualized and may be diminutive or occluded."


 -SHERICE of the left side obtained today and revealed a reading of 1.22 per [facility] 

flow.  This is consistent with findings of the arterial Doppler.


 -To wound: Cleanse and pat dry.  Apply skin prep to surrounding tissue.  Cover 

with Xeroform and secure with Kerlix.  Change every other day or as needed if 

dressing loose or saturated.


 -Recommend patient offload as much as possible.  If wound stagnates or worsen 

would recommend offloading shoe with ambulation.


Upon discharge we can continue to follow the patient at the wound care center. 

Thank you for the consult.











KATE PARK APRN         Aug 19, 2020 15:49

## 2020-08-19 NOTE — PDOC
PROGRESS NOTES


Date of Service:


DATE: 8/19/20 


TIME: 18:55





Vitals


Vitals





Vital Signs








  Date Time  Temp Pulse Resp B/P (MAP) Pulse Ox O2 Delivery O2 Flow Rate FiO2


 


8/19/20 18:10      Room Air  


 


8/19/20 15:30     97   


 


8/19/20 15:00 98.3 72 16 118/56 (76)    





 98.3       











Physical Exam


General:  Alert, Oriented X3, Cooperative, No acute distress


Lungs:  Clear


Abdomen:  Normal bowel sounds, Other (Obese)


Extremities:  Normal pulses, Other (+1 edema bilaterally)


Skin:  Other (~2x3 cm superficial ulceration to lateral left big toe, minimal 

surounding erythema.)





Labs


LABS





Laboratory Tests








Test


 8/18/20


21:57 8/18/20


22:35 8/19/20


07:43 8/19/20


09:10


 


White Blood Count


 8.0 x10^3/uL


(4.0-11.0) 


 


 6.2 x10^3/uL


(4.0-11.0)


 


Red Blood Count


 3.76 x10^6/uL


(4.30-5.70) 


 


 3.83 x10^6/uL


(4.30-5.70)


 


Hemoglobin


 10.8 g/dL


(13.0-17.5) 


 


 10.9 g/dL


(13.0-17.5)


 


Hematocrit


 32.7 %


(39.0-53.0) 


 


 33.5 %


(39.0-53.0)


 


Mean Corpuscular Volume 87 fL ()    87 fL () 


 


Mean Corpuscular Hemoglobin 29 pg (25-35)    28 pg (25-35) 


 


Mean Corpuscular Hemoglobin


Concent 33 g/dL


(31-37) 


 


 33 g/dL


(31-37)


 


Red Cell Distribution Width


 16.8 %


(11.5-14.5) 


 


 17.2 %


(11.5-14.5)


 


Platelet Count


 245 x10^3/uL


(140-400) 


 


 216 x10^3/uL


(140-400)


 


Neutrophils (%) (Auto) 67 % (31-73)    74 % (31-73) 


 


Lymphocytes (%) (Auto) 22 % (24-48)    16 % (24-48) 


 


Monocytes (%) (Auto) 5 % (0-9)    6 % (0-9) 


 


Eosinophils (%) (Auto) 5 % (0-3)    5 % (0-3) 


 


Basophils (%) (Auto) 1 % (0-3)    0 % (0-3) 


 


Neutrophils # (Auto)


 5.3 x10^3/uL


(1.8-7.7) 


 


 4.5 x10^3/uL


(1.8-7.7)


 


Lymphocytes # (Auto)


 1.7 x10^3/uL


(1.0-4.8) 


 


 1.0 x10^3/uL


(1.0-4.8)


 


Monocytes # (Auto)


 0.4 x10^3/uL


(0.0-1.1) 


 


 0.4 x10^3/uL


(0.0-1.1)


 


Eosinophils # (Auto)


 0.4 x10^3/uL


(0.0-0.7) 


 


 0.3 x10^3/uL


(0.0-0.7)


 


Basophils # (Auto)


 0.1 x10^3/uL


(0.0-0.2) 


 


 0.0 x10^3/uL


(0.0-0.2)


 


Prothrombin Time


 13.8 SEC


(11.7-14.0) 


 


 





 


Prothromb Time International


Ratio 1.1 (0.8-1.1) 


 


 


 





 


Sodium Level


 136 mmol/L


(136-145) 


 


 138 mmol/L


(136-145)


 


Potassium Level


 4.1 mmol/L


(3.5-5.1) 


 


 4.3 mmol/L


(3.5-5.1)


 


Chloride Level


 99 mmol/L


() 


 


 99 mmol/L


()


 


Carbon Dioxide Level


 31 mmol/L


(21-32) 


 


 33 mmol/L


(21-32)


 


Anion Gap 6 (6-14)    6 (6-14) 


 


Blood Urea Nitrogen


 23 mg/dL


(8-26) 


 


 19 mg/dL


(8-26)


 


Creatinine


 1.2 mg/dL


(0.7-1.3) 


 


 1.0 mg/dL


(0.7-1.3)


 


Estimated GFR


(Cockcroft-Gault) 64.9 


 


 


 80.1 





 


BUN/Creatinine Ratio 19 (6-20)    


 


Glucose Level


 265 mg/dL


(70-99) 


 


 226 mg/dL


(70-99)


 


Lactic Acid Level


 2.2 mmol/L


(0.4-2.0) 


 


 1.5 mmol/L


(0.4-2.0)


 


Calcium Level


 9.3 mg/dL


(8.5-10.1) 


 


 8.8 mg/dL


(8.5-10.1)


 


Total Bilirubin


 0.3 mg/dL


(0.2-1.0) 


 


 





 


Aspartate Amino Transf


(AST/SGOT) 12 U/L (15-37) 


 


 


 





 


Alanine Aminotransferase


(ALT/SGPT) 20 U/L (16-63) 


 


 


 





 


Alkaline Phosphatase


 79 U/L


() 


 


 





 


Total Protein


 7.9 g/dL


(6.4-8.2) 


 


 





 


Albumin


 3.0 g/dL


(3.4-5.0) 


 


 





 


Albumin/Globulin Ratio 0.6 (1.0-1.7)    


 


Urine Collection Type  Unknown   


 


Urine Color  Yellow   


 


Urine Clarity  Clear   


 


Urine pH  7.5 (<5.0-8.0)   


 


Urine Specific Gravity


 


 1.020


(1.000-1.030) 


 





 


Urine Protein


 


 Negative mg/dL


(NEG-TRACE) 


 





 


Urine Glucose (UA)


 


 100 mg/dL


(NEG) 


 





 


Urine Ketones (Stick)


 


 Negative mg/dL


(NEG) 


 





 


Urine Blood  Negative (NEG)   


 


Urine Nitrite  Negative (NEG)   


 


Urine Bilirubin  Negative (NEG)   


 


Urine Urobilinogen Dipstick


 


 0.2 mg/dL (0.2


mg/dL) 


 





 


Urine Leukocyte Esterase  Negative (NEG)   


 


Urine RBC  0 /HPF (0-2)   


 


Urine WBC  0 /HPF (0-4)   


 


Urine Squamous Epithelial


Cells 


 Few /LPF 


 


 





 


Urine Bacteria  0 /HPF (0-FEW)   


 


Glucose (Fingerstick)


 


 


 168 mg/dL


(70-99) 





 


Test


 8/19/20


11:38 8/19/20


16:52 


 





 


Glucose (Fingerstick)


 198 mg/dL


(70-99) 202 mg/dL


(70-99) 


 














Comment


Review of Relevant


I have reviewed the following items apurva (where applicable) has been applied.


Labs





Laboratory Tests








Test


 8/18/20


21:57 8/18/20


22:35 8/19/20


07:43 8/19/20


09:10


 


White Blood Count


 8.0 x10^3/uL


(4.0-11.0) 


 


 6.2 x10^3/uL


(4.0-11.0)


 


Red Blood Count


 3.76 x10^6/uL


(4.30-5.70) 


 


 3.83 x10^6/uL


(4.30-5.70)


 


Hemoglobin


 10.8 g/dL


(13.0-17.5) 


 


 10.9 g/dL


(13.0-17.5)


 


Hematocrit


 32.7 %


(39.0-53.0) 


 


 33.5 %


(39.0-53.0)


 


Mean Corpuscular Volume 87 fL ()    87 fL () 


 


Mean Corpuscular Hemoglobin 29 pg (25-35)    28 pg (25-35) 


 


Mean Corpuscular Hemoglobin


Concent 33 g/dL


(31-37) 


 


 33 g/dL


(31-37)


 


Red Cell Distribution Width


 16.8 %


(11.5-14.5) 


 


 17.2 %


(11.5-14.5)


 


Platelet Count


 245 x10^3/uL


(140-400) 


 


 216 x10^3/uL


(140-400)


 


Neutrophils (%) (Auto) 67 % (31-73)    74 % (31-73) 


 


Lymphocytes (%) (Auto) 22 % (24-48)    16 % (24-48) 


 


Monocytes (%) (Auto) 5 % (0-9)    6 % (0-9) 


 


Eosinophils (%) (Auto) 5 % (0-3)    5 % (0-3) 


 


Basophils (%) (Auto) 1 % (0-3)    0 % (0-3) 


 


Neutrophils # (Auto)


 5.3 x10^3/uL


(1.8-7.7) 


 


 4.5 x10^3/uL


(1.8-7.7)


 


Lymphocytes # (Auto)


 1.7 x10^3/uL


(1.0-4.8) 


 


 1.0 x10^3/uL


(1.0-4.8)


 


Monocytes # (Auto)


 0.4 x10^3/uL


(0.0-1.1) 


 


 0.4 x10^3/uL


(0.0-1.1)


 


Eosinophils # (Auto)


 0.4 x10^3/uL


(0.0-0.7) 


 


 0.3 x10^3/uL


(0.0-0.7)


 


Basophils # (Auto)


 0.1 x10^3/uL


(0.0-0.2) 


 


 0.0 x10^3/uL


(0.0-0.2)


 


Prothrombin Time


 13.8 SEC


(11.7-14.0) 


 


 





 


Prothromb Time International


Ratio 1.1 (0.8-1.1) 


 


 


 





 


Sodium Level


 136 mmol/L


(136-145) 


 


 138 mmol/L


(136-145)


 


Potassium Level


 4.1 mmol/L


(3.5-5.1) 


 


 4.3 mmol/L


(3.5-5.1)


 


Chloride Level


 99 mmol/L


() 


 


 99 mmol/L


()


 


Carbon Dioxide Level


 31 mmol/L


(21-32) 


 


 33 mmol/L


(21-32)


 


Anion Gap 6 (6-14)    6 (6-14) 


 


Blood Urea Nitrogen


 23 mg/dL


(8-26) 


 


 19 mg/dL


(8-26)


 


Creatinine


 1.2 mg/dL


(0.7-1.3) 


 


 1.0 mg/dL


(0.7-1.3)


 


Estimated GFR


(Cockcroft-Gault) 64.9 


 


 


 80.1 





 


BUN/Creatinine Ratio 19 (6-20)    


 


Glucose Level


 265 mg/dL


(70-99) 


 


 226 mg/dL


(70-99)


 


Lactic Acid Level


 2.2 mmol/L


(0.4-2.0) 


 


 1.5 mmol/L


(0.4-2.0)


 


Calcium Level


 9.3 mg/dL


(8.5-10.1) 


 


 8.8 mg/dL


(8.5-10.1)


 


Total Bilirubin


 0.3 mg/dL


(0.2-1.0) 


 


 





 


Aspartate Amino Transf


(AST/SGOT) 12 U/L (15-37) 


 


 


 





 


Alanine Aminotransferase


(ALT/SGPT) 20 U/L (16-63) 


 


 


 





 


Alkaline Phosphatase


 79 U/L


() 


 


 





 


Total Protein


 7.9 g/dL


(6.4-8.2) 


 


 





 


Albumin


 3.0 g/dL


(3.4-5.0) 


 


 





 


Albumin/Globulin Ratio 0.6 (1.0-1.7)    


 


Urine Collection Type  Unknown   


 


Urine Color  Yellow   


 


Urine Clarity  Clear   


 


Urine pH  7.5 (<5.0-8.0)   


 


Urine Specific Gravity


 


 1.020


(1.000-1.030) 


 





 


Urine Protein


 


 Negative mg/dL


(NEG-TRACE) 


 





 


Urine Glucose (UA)


 


 100 mg/dL


(NEG) 


 





 


Urine Ketones (Stick)


 


 Negative mg/dL


(NEG) 


 





 


Urine Blood  Negative (NEG)   


 


Urine Nitrite  Negative (NEG)   


 


Urine Bilirubin  Negative (NEG)   


 


Urine Urobilinogen Dipstick


 


 0.2 mg/dL (0.2


mg/dL) 


 





 


Urine Leukocyte Esterase  Negative (NEG)   


 


Urine RBC  0 /HPF (0-2)   


 


Urine WBC  0 /HPF (0-4)   


 


Urine Squamous Epithelial


Cells 


 Few /LPF 


 


 





 


Urine Bacteria  0 /HPF (0-FEW)   


 


Glucose (Fingerstick)


 


 


 168 mg/dL


(70-99) 





 


Test


 8/19/20


11:38 8/19/20


16:52 


 





 


Glucose (Fingerstick)


 198 mg/dL


(70-99) 202 mg/dL


(70-99) 


 











Laboratory Tests








Test


 8/18/20


21:57 8/18/20


22:35 8/19/20


07:43 8/19/20


09:10


 


White Blood Count


 8.0 x10^3/uL


(4.0-11.0) 


 


 6.2 x10^3/uL


(4.0-11.0)


 


Red Blood Count


 3.76 x10^6/uL


(4.30-5.70) 


 


 3.83 x10^6/uL


(4.30-5.70)


 


Hemoglobin


 10.8 g/dL


(13.0-17.5) 


 


 10.9 g/dL


(13.0-17.5)


 


Hematocrit


 32.7 %


(39.0-53.0) 


 


 33.5 %


(39.0-53.0)


 


Mean Corpuscular Volume 87 fL ()    87 fL () 


 


Mean Corpuscular Hemoglobin 29 pg (25-35)    28 pg (25-35) 


 


Mean Corpuscular Hemoglobin


Concent 33 g/dL


(31-37) 


 


 33 g/dL


(31-37)


 


Red Cell Distribution Width


 16.8 %


(11.5-14.5) 


 


 17.2 %


(11.5-14.5)


 


Platelet Count


 245 x10^3/uL


(140-400) 


 


 216 x10^3/uL


(140-400)


 


Neutrophils (%) (Auto) 67 % (31-73)    74 % (31-73) 


 


Lymphocytes (%) (Auto) 22 % (24-48)    16 % (24-48) 


 


Monocytes (%) (Auto) 5 % (0-9)    6 % (0-9) 


 


Eosinophils (%) (Auto) 5 % (0-3)    5 % (0-3) 


 


Basophils (%) (Auto) 1 % (0-3)    0 % (0-3) 


 


Neutrophils # (Auto)


 5.3 x10^3/uL


(1.8-7.7) 


 


 4.5 x10^3/uL


(1.8-7.7)


 


Lymphocytes # (Auto)


 1.7 x10^3/uL


(1.0-4.8) 


 


 1.0 x10^3/uL


(1.0-4.8)


 


Monocytes # (Auto)


 0.4 x10^3/uL


(0.0-1.1) 


 


 0.4 x10^3/uL


(0.0-1.1)


 


Eosinophils # (Auto)


 0.4 x10^3/uL


(0.0-0.7) 


 


 0.3 x10^3/uL


(0.0-0.7)


 


Basophils # (Auto)


 0.1 x10^3/uL


(0.0-0.2) 


 


 0.0 x10^3/uL


(0.0-0.2)


 


Prothrombin Time


 13.8 SEC


(11.7-14.0) 


 


 





 


Prothromb Time International


Ratio 1.1 (0.8-1.1) 


 


 


 





 


Sodium Level


 136 mmol/L


(136-145) 


 


 138 mmol/L


(136-145)


 


Potassium Level


 4.1 mmol/L


(3.5-5.1) 


 


 4.3 mmol/L


(3.5-5.1)


 


Chloride Level


 99 mmol/L


() 


 


 99 mmol/L


()


 


Carbon Dioxide Level


 31 mmol/L


(21-32) 


 


 33 mmol/L


(21-32)


 


Anion Gap 6 (6-14)    6 (6-14) 


 


Blood Urea Nitrogen


 23 mg/dL


(8-26) 


 


 19 mg/dL


(8-26)


 


Creatinine


 1.2 mg/dL


(0.7-1.3) 


 


 1.0 mg/dL


(0.7-1.3)


 


Estimated GFR


(Cockcroft-Gault) 64.9 


 


 


 80.1 





 


BUN/Creatinine Ratio 19 (6-20)    


 


Glucose Level


 265 mg/dL


(70-99) 


 


 226 mg/dL


(70-99)


 


Lactic Acid Level


 2.2 mmol/L


(0.4-2.0) 


 


 1.5 mmol/L


(0.4-2.0)


 


Calcium Level


 9.3 mg/dL


(8.5-10.1) 


 


 8.8 mg/dL


(8.5-10.1)


 


Total Bilirubin


 0.3 mg/dL


(0.2-1.0) 


 


 





 


Aspartate Amino Transf


(AST/SGOT) 12 U/L (15-37) 


 


 


 





 


Alanine Aminotransferase


(ALT/SGPT) 20 U/L (16-63) 


 


 


 





 


Alkaline Phosphatase


 79 U/L


() 


 


 





 


Total Protein


 7.9 g/dL


(6.4-8.2) 


 


 





 


Albumin


 3.0 g/dL


(3.4-5.0) 


 


 





 


Albumin/Globulin Ratio 0.6 (1.0-1.7)    


 


Urine Collection Type  Unknown   


 


Urine Color  Yellow   


 


Urine Clarity  Clear   


 


Urine pH  7.5 (<5.0-8.0)   


 


Urine Specific Gravity


 


 1.020


(1.000-1.030) 


 





 


Urine Protein


 


 Negative mg/dL


(NEG-TRACE) 


 





 


Urine Glucose (UA)


 


 100 mg/dL


(NEG) 


 





 


Urine Ketones (Stick)


 


 Negative mg/dL


(NEG) 


 





 


Urine Blood  Negative (NEG)   


 


Urine Nitrite  Negative (NEG)   


 


Urine Bilirubin  Negative (NEG)   


 


Urine Urobilinogen Dipstick


 


 0.2 mg/dL (0.2


mg/dL) 


 





 


Urine Leukocyte Esterase  Negative (NEG)   


 


Urine RBC  0 /HPF (0-2)   


 


Urine WBC  0 /HPF (0-4)   


 


Urine Squamous Epithelial


Cells 


 Few /LPF 


 


 





 


Urine Bacteria  0 /HPF (0-FEW)   


 


Glucose (Fingerstick)


 


 


 168 mg/dL


(70-99) 





 


Test


 8/19/20


11:38 8/19/20


16:52 


 





 


Glucose (Fingerstick)


 198 mg/dL


(70-99) 202 mg/dL


(70-99) 


 











Microbiology


8/19/20 Gram Stain - Final, Resulted


          


8/19/20 Aerobic Culture, Resulted


          Pending


Medications





Current Medications


Sodium Chloride 500 ml @  500 mls/hr 1X  ONCE IV  Last administered on 8/18/20at

21:43;  Start 8/18/20 at 21:30;  Stop 8/18/20 at 22:29;  Status DC


Vancomycin HCl (Vanco Per Pharmacy) 1 each PRN DAILY  PRN MC SEE COMMENTS Last 

administered on 8/19/20at 00:20;  Start 8/18/20 at 21:00;  Stop 8/19/20 at 

09:37;  Status DC


Piperacillin Sod/ Tazobactam Sod 4.5 gm/Sodium Chloride 100 ml @  200 mls/hr 1X 

ONCE IV  Last administered on 8/18/20at 21:32;  Start 8/18/20 at 21:30;  Stop 

8/18/20 at 21:59;  Status DC


Vancomycin HCl 2 gm/Sodium Chloride 500 ml @  250 mls/hr 1X  ONCE IV  Last 

administered on 8/18/20at 21:32;  Start 8/18/20 at 22:00;  Stop 8/18/20 at 

23:59;  Status DC


Piperacillin Sod/ Tazobactam Sod 4.5 gm/Sodium Chloride 100 ml @  200 mls/hr 

Q6HRS IV ;  Start 8/19/20 at 00:00;  Status Cancel


Hydromorphone HCl (Dilaudid) 1 mg 1X  ONCE IV  Last administered on 8/19/20at 

00:16;  Start 8/19/20 at 00:00;  Stop 8/19/20 at 00:01;  Status DC


Hydromorphone HCl (Dilaudid) 0.5 mg PRN Q4HRS  PRN IV SEVERE PAIN 7-10 Last 

administered on 8/19/20at 17:20;  Start 8/18/20 at 23:45


Piperacillin Sod/ Tazobactam Sod 4.5 gm/Sodium Chloride 100 ml @  200 mls/hr 

Q6HRS IV  Last administered on 8/19/20at 17:20;  Start 8/19/20 at 02:00


Vancomycin HCl 1.5 gm/Sodium Chloride 500 ml @  250 mls/hr Q8HRS IV  Last 

administered on 8/19/20at 06:43;  Start 8/19/20 at 06:00;  Stop 8/19/20 at 

09:37;  Status DC


Vancomycin HCl (Vancomycin Trough Level) 1 each 1X  ONCE MC ;  Start 8/19/20 at 

21:30;  Stop 8/19/20 at 09:37;  Status DC


Sodium Chloride 1,000 ml @  125 mls/hr 1X  ONCE IV  Last administered on 

8/19/20at 08:50;  Start 8/19/20 at 07:45;  Stop 8/19/20 at 15:44;  Status DC


Aspirin (Aspirin Chewable) 81 mg DAILY PO  Last administered on 8/19/20at 10:41;

 Start 8/19/20 at 12:00


Docusate Sodium (Colace) 100 mg PRN BID  PRN PO CONSTIPATION;  Start 8/19/20 at 

10:15


Gabapentin (Neurontin) 300 mg TID PO  Last administered on 8/19/20at 14:37;  

Start 8/19/20 at 14:00


Metoprolol Tartrate (Lopressor) 25 mg BID PO  Last administered on 8/19/20at 

10:42;  Start 8/19/20 at 11:00


Oxycodone/ Acetaminophen (Percocet 10/325) 1 tab PRN Q6HRS  PRN PO MODERATE TO 

SEVERE PAIN Last administered on 8/19/20at 12:16;  Start 8/19/20 at 10:15


Non-Formulary Medication (Albuterol Sulfate (Proventil Hfa Inhaler)) 2 puff BID 

IH ;  Start 8/19/20 at 21:00;  Status UNV


Non-Formulary Medication (Budesonide/ Formoterol Fumarate (Symbicort 160-4.5 Mcg

Inhaler)) 2 puff BID IH ;  Start 8/19/20 at 21:00;  Status UNV


Lisinopril (Prinivil) 20 mg DAILY PO  Last administered on 8/19/20at 10:41;  

Start 8/19/20 at 11:00


Atorvastatin Calcium (Lipitor) 5 mg QHS PO ;  Start 8/19/20 at 21:00


Multivitamins (Thera M Plus) 1 tab DAILY PO  Last administered on 8/19/20at 

10:41;  Start 8/19/20 at 09:00


Lactobacillus Rhamnosus (Culturelle) 1 cap BID PO ;  Start 8/19/20 at 21:00


Insulin Glargine (Lantus Syringe) 25 unit QHS SQ ;  Start 8/19/20 at 21:00


Insulin Human Lispro (HumaLOG) 0-7 UNITS TIDWMEALS SQ  Last administered on 

8/19/20at 17:26;  Start 8/19/20 at 12:00


Dextrose (Dextrose 50%-Water Syringe) 12.5 gm PRN Q15MIN  PRN IV SEE COMMENTS;  

Start 8/19/20 at 10:30


Insulin Human Lispro (HumaLOG) 8 units TIDWMEALS SQ  Last administered on 

8/19/20at 17:26;  Start 8/19/20 at 12:00


Hydrochlorothiazide (Hydrodiuril) 25 mg DAILY PO  Last administered on 8/19/20at

10:41;  Start 8/19/20 at 11:00


Albuterol Sulfate (Ventolin Neb Soln) 2.5 mg Q6HRS NEB  Last administered on 

8/19/20at 15:28;  Start 8/19/20 at 12:00


Budesonide (Pulmicort) 0.5 mg RTBID NEB  Last administered on 8/19/20at 15:28;  

Start 8/19/20 at 11:00


Oxycodone HCl (Roxicodone) 10 mg PRN Q4HRS  PRN PO MODERATE PAIN Last 

administered on 8/19/20at 16:09;  Start 8/19/20 at 15:00


Morphine Sulfate (Morphine Sulfate) 4 mg PRN Q4HRS  PRN IV MODERATE TO SEVERE 

PAIN;  Start 8/19/20 at 15:00





Active Scripts


Active


Naproxen 500 Mg Tablet 1 Tab PO BID PRN


Percocet  Mg Tablet ** (Oxycodone/Acetaminophen) 1 Each Tablet 1 Tab PO 

PRN Q6HRS PRN 14 Days


Colace (Docusate Sodium) 100 Mg Capsule 100 Mg PO PRN BID PRN 30 Days


Reported


Norco 5-325 Tablet (Acetaminophen/Hydrocodone Bitart) 1 Each Tablet 1 Tab PO PRN

BID PRN


Lantus Solostar (Insulin Glargine,Hum.rec.anlog) 100 Unit/1 Ml Insuln.pen 20 

Unit SQ QHS


Gabapentin ** (Gabapentin) 300 Mg Capsule 300 Mg PO TID


Janumet 50-1,000 Mg Tablet (Sitagliptin Phos/Metformin Hcl) 1 Each Tablet 1 Tab 

PO BID


Multivitamins (Multivitamin) 1 Each Tablet 1 Tab PO DAILY


Metoprolol Tartrate 25 Mg Tablet 1 Tab PO BID


Livalo (Pitavastatin Calcium) 4 Mg Tablet 4 Mg PO 


Aspirin 81 Mg Tab.chew 1 Tab PO DAILY


Iron Supplement (Ferrous Sulfate) 325 Mg Tablet 65 Mg PO 


Proventil Hfa Inhaler (Albuterol Sulfate) 6.7 Gm Hfa.aer.ad 2 Puff IH BID


Lisinopril-Hctz 20-25 Mg Tab (Lisinopril/Hydrochlorothiazide) 1 Each Tablet 1 

Tab PO DAILY


Lovastatin 20 Mg Tablet 10 Mg PO HS


Symbicort 160-4.5 Mcg Inhaler (Budesonide/Formoterol Fumarate) 10.2 Gm 

Hfa.aer.ad 2 Puff IH BID


Vitals/I & O





Vital Sign - Last 24 Hours








 8/18/20 8/18/20 8/19/20 8/19/20





 20:35 22:40 00:16 00:46


 


Temp 97.6   





 97.6   


 


Pulse 84 88  


 


Resp 12   18


 


B/P (MAP) 161/95 (117) 138/88 (105)  


 


Pulse Ox 95 100  


 


O2 Delivery Room Air Room Air Room Air Room Air


 


    





    





 8/19/20 8/19/20 8/19/20 8/19/20





 00:47 00:52 03:00 07:10


 


Temp 98.4   97.8





 98.4   97.8


 


Pulse 98   80


 


Resp 20  20 16


 


B/P (MAP) 114/73 (87)   144/68 (93)


 


Pulse Ox 94   98


 


O2 Delivery Room Air Room Air Room Air Room Air


 


    





    





 8/19/20 8/19/20 8/19/20 8/19/20





 07:20 08:50 09:55 10:41


 


Pulse    80


 


B/P (MAP)    144/68


 


O2 Delivery Room Air Room Air Room Air 





 8/19/20 8/19/20 8/19/20 8/19/20





 10:42 11:00 12:16 13:55


 


Temp  98.4  





  98.4  


 


Pulse 80 80  


 


Resp  18  


 


B/P (MAP) 144/68 143/69 (93)  


 


Pulse Ox  98  


 


O2 Delivery  Room Air Room Air Room Air


 


    





    





 8/19/20 8/19/20 8/19/20 8/19/20





 15:00 15:30 16:09 17:20


 


Temp 98.3   





 98.3   


 


Pulse 72   


 


Resp 16   


 


B/P (MAP) 118/56 (76)   


 


Pulse Ox 98 97  


 


O2 Delivery Room Air Room Air Room Air Room Air


 


    





    





 8/19/20 8/19/20  





 17:27 18:10  


 


O2 Delivery Room Air Room Air  














Intake and Output 0  


 


 8/18/20 8/18/20 8/19/20





 15:00 23:00 07:00


 


Intake Total   1100 ml


 


Balance   1100 ml











Justicifation of Admission Dx:


Justifications for Admission:


Justification of Admission Dx:  Yes











MARY LOU MO MD            Aug 19, 2020 18:56

## 2020-08-19 NOTE — NUR
Wound Care



Wound Type/Assessment:  Left great toe DFU, present approx. 5 days. Per pt, began as a 
blister, pt has peripheral neuropathy and very little feeling in his feet. Pt was assessed 
with DAVID Bolden, who trimmed some callus and macerated skin from the periwound, 
wound bed is clean with epithelial islands throughout, wound should heal with minimal wound 
care as long as pressure reduction can be maintained.



Treatment Recommendations/Plan: Wound was photographed and redressed with Xeroform gauze, 
gauze and kerlix, recommended change every other day.



Education provided: To pt re: daily foot checks, glycemic control and offloading of wound 
with Diabetic footwear.



Offloading surface/device: pt has cane to reduce some pressure, but with pt's OA and limited 
mobility, he may be more of a fall risk with a half-shoe.



Recommended Referrals/Tests: Quantiflo completed with SHERICE value of 1.22.



Discharge Recommendations for dressings: Xeroform gauze, gauze and kerlix, recommended 
change every other day.

## 2020-08-19 NOTE — NUR
Pt states the Percocet makes him feel "antsy" and states he does not want to take it 
anymore. Dr. Sih notified, telephone orders received.

## 2020-08-20 VITALS
DIASTOLIC BLOOD PRESSURE: 55 MMHG | SYSTOLIC BLOOD PRESSURE: 135 MMHG | DIASTOLIC BLOOD PRESSURE: 55 MMHG | SYSTOLIC BLOOD PRESSURE: 135 MMHG | DIASTOLIC BLOOD PRESSURE: 55 MMHG | SYSTOLIC BLOOD PRESSURE: 135 MMHG

## 2020-08-20 VITALS — DIASTOLIC BLOOD PRESSURE: 67 MMHG | SYSTOLIC BLOOD PRESSURE: 143 MMHG

## 2020-08-20 VITALS — SYSTOLIC BLOOD PRESSURE: 139 MMHG | DIASTOLIC BLOOD PRESSURE: 58 MMHG

## 2020-08-20 VITALS — SYSTOLIC BLOOD PRESSURE: 149 MMHG | DIASTOLIC BLOOD PRESSURE: 67 MMHG

## 2020-08-20 LAB
ANION GAP SERPL CALC-SCNC: 4 MMOL/L (ref 6–14)
BASOPHILS # BLD AUTO: 0 X10^3/UL (ref 0–0.2)
BASOPHILS NFR BLD: 0 % (ref 0–3)
BUN SERPL-MCNC: 16 MG/DL (ref 8–26)
CALCIUM SERPL-MCNC: 8.6 MG/DL (ref 8.5–10.1)
CHLORIDE SERPL-SCNC: 99 MMOL/L (ref 98–107)
CO2 SERPL-SCNC: 32 MMOL/L (ref 21–32)
CREAT SERPL-MCNC: 1 MG/DL (ref 0.7–1.3)
EOSINOPHIL NFR BLD: 0.4 X10^3/UL (ref 0–0.7)
EOSINOPHIL NFR BLD: 5 % (ref 0–3)
ERYTHROCYTE [DISTWIDTH] IN BLOOD BY AUTOMATED COUNT: 16.9 % (ref 11.5–14.5)
GFR SERPLBLD BASED ON 1.73 SQ M-ARVRAT: 80.1 ML/MIN
GLUCOSE SERPL-MCNC: 189 MG/DL (ref 70–99)
HCT VFR BLD CALC: 32.6 % (ref 39–53)
HGB BLD-MCNC: 10.7 G/DL (ref 13–17.5)
LYMPHOCYTES # BLD: 1.4 X10^3/UL (ref 1–4.8)
LYMPHOCYTES NFR BLD AUTO: 18 % (ref 24–48)
MCH RBC QN AUTO: 29 PG (ref 25–35)
MCHC RBC AUTO-ENTMCNC: 33 G/DL (ref 31–37)
MCV RBC AUTO: 87 FL (ref 79–100)
MONO #: 0.4 X10^3/UL (ref 0–1.1)
MONOCYTES NFR BLD: 6 % (ref 0–9)
NEUT #: 5.3 X10^3/UL (ref 1.8–7.7)
NEUTROPHILS NFR BLD AUTO: 70 % (ref 31–73)
PLATELET # BLD AUTO: 240 X10^3/UL (ref 140–400)
POTASSIUM SERPL-SCNC: 4.2 MMOL/L (ref 3.5–5.1)
RBC # BLD AUTO: 3.75 X10^6/UL (ref 4.3–5.7)
SODIUM SERPL-SCNC: 135 MMOL/L (ref 136–145)
WBC # BLD AUTO: 7.6 X10^3/UL (ref 4–11)

## 2020-08-20 RX ADMIN — PIPERACILLIN SODIUM AND TAZOBACTAM SODIUM SCH MLS/HR: 4; .5 INJECTION, POWDER, LYOPHILIZED, FOR SOLUTION INTRAVENOUS at 06:07

## 2020-08-20 RX ADMIN — INSULIN LISPRO SCH UNITS: 100 INJECTION, SOLUTION INTRAVENOUS; SUBCUTANEOUS at 12:00

## 2020-08-20 RX ADMIN — ALBUTEROL SULFATE SCH MG: 108 AEROSOL, METERED RESPIRATORY (INHALATION) at 11:32

## 2020-08-20 RX ADMIN — PIPERACILLIN SODIUM AND TAZOBACTAM SODIUM SCH MLS/HR: 4; .5 INJECTION, POWDER, LYOPHILIZED, FOR SOLUTION INTRAVENOUS at 00:25

## 2020-08-20 RX ADMIN — HYDROMORPHONE HYDROCHLORIDE PRN MG: 2 INJECTION INTRAMUSCULAR; INTRAVENOUS; SUBCUTANEOUS at 00:25

## 2020-08-20 RX ADMIN — METOPROLOL TARTRATE SCH MG: 25 TABLET ORAL at 09:40

## 2020-08-20 RX ADMIN — GABAPENTIN SCH MG: 300 CAPSULE ORAL at 15:08

## 2020-08-20 RX ADMIN — LISINOPRIL SCH MG: 20 TABLET ORAL at 09:39

## 2020-08-20 RX ADMIN — ASPIRIN 81 MG SCH MG: 81 TABLET ORAL at 09:40

## 2020-08-20 RX ADMIN — INSULIN LISPRO SCH UNITS: 100 INJECTION, SOLUTION INTRAVENOUS; SUBCUTANEOUS at 08:00

## 2020-08-20 RX ADMIN — MULTIPLE VITAMINS W/ MINERALS TAB SCH TAB: TAB at 09:38

## 2020-08-20 RX ADMIN — INSULIN LISPRO SCH UNITS: 100 INJECTION, SOLUTION INTRAVENOUS; SUBCUTANEOUS at 12:09

## 2020-08-20 RX ADMIN — INSULIN LISPRO SCH UNITS: 100 INJECTION, SOLUTION INTRAVENOUS; SUBCUTANEOUS at 07:59

## 2020-08-20 RX ADMIN — ALBUTEROL SULFATE SCH MG: 108 AEROSOL, METERED RESPIRATORY (INHALATION) at 07:15

## 2020-08-20 RX ADMIN — Medication SCH CAP: at 09:38

## 2020-08-20 RX ADMIN — PIPERACILLIN SODIUM AND TAZOBACTAM SODIUM SCH MLS/HR: 4; .5 INJECTION, POWDER, LYOPHILIZED, FOR SOLUTION INTRAVENOUS at 12:00

## 2020-08-20 RX ADMIN — BUDESONIDE SCH MG: 0.5 INHALANT RESPIRATORY (INHALATION) at 07:15

## 2020-08-20 RX ADMIN — GABAPENTIN SCH MG: 300 CAPSULE ORAL at 09:39

## 2020-08-20 NOTE — PDOC
PROGRESS NOTES


Date of Service:


DATE: 8/20/20 


TIME: 09:53





Chief Complaint


Chief Complaint


Left toe pain





History of Present Illness


History of Present Illness


Patient states his pain is well controlled.  Discussed potential plan of 

outpatient wound care, short course of antibiotics, and tight blood glucose 

control.  Patient conveys understanding and states he is ready for discharge.





Vitals


Vitals





Vital Signs








  Date Time  Temp Pulse Resp B/P (MAP) Pulse Ox O2 Delivery O2 Flow Rate FiO2


 


8/20/20 09:40  87  143/67    


 


8/20/20 07:17     98 Room Air  


 


8/20/20 07:00 97.7  18     





 97.7       











Physical Exam


General:  Alert, Oriented X3, Cooperative, No acute distress


Lungs:  Clear


Abdomen:  Normal bowel sounds, Other (Obese)


Extremities:  Normal pulses, Other (+1 edema bilaterally)


Skin:  Other (~2x3 cm superficial ulceration to lateral left big toe, minimal 

surounding erythema.)





Labs


LABS





Laboratory Tests








Test


 8/19/20


11:38 8/19/20


16:52 8/19/20


21:08 8/20/20


04:40


 


Glucose (Fingerstick)


 198 mg/dL


(70-99) 202 mg/dL


(70-99) 200 mg/dL


(70-99) 





 


White Blood Count


 


 


 


 7.6 x10^3/uL


(4.0-11.0)


 


Red Blood Count


 


 


 


 3.75 x10^6/uL


(4.30-5.70)


 


Hemoglobin


 


 


 


 10.7 g/dL


(13.0-17.5)


 


Hematocrit


 


 


 


 32.6 %


(39.0-53.0)


 


Mean Corpuscular Volume    87 fL () 


 


Mean Corpuscular Hemoglobin    29 pg (25-35) 


 


Mean Corpuscular Hemoglobin


Concent 


 


 


 33 g/dL


(31-37)


 


Red Cell Distribution Width


 


 


 


 16.9 %


(11.5-14.5)


 


Platelet Count


 


 


 


 240 x10^3/uL


(140-400)


 


Neutrophils (%) (Auto)    70 % (31-73) 


 


Lymphocytes (%) (Auto)    18 % (24-48) 


 


Monocytes (%) (Auto)    6 % (0-9) 


 


Eosinophils (%) (Auto)    5 % (0-3) 


 


Basophils (%) (Auto)    0 % (0-3) 


 


Neutrophils # (Auto)


 


 


 


 5.3 x10^3/uL


(1.8-7.7)


 


Lymphocytes # (Auto)


 


 


 


 1.4 x10^3/uL


(1.0-4.8)


 


Monocytes # (Auto)


 


 


 


 0.4 x10^3/uL


(0.0-1.1)


 


Eosinophils # (Auto)


 


 


 


 0.4 x10^3/uL


(0.0-0.7)


 


Basophils # (Auto)


 


 


 


 0.0 x10^3/uL


(0.0-0.2)


 


Sodium Level


 


 


 


 135 mmol/L


(136-145)


 


Potassium Level


 


 


 


 4.2 mmol/L


(3.5-5.1)


 


Chloride Level


 


 


 


 99 mmol/L


()


 


Carbon Dioxide Level


 


 


 


 32 mmol/L


(21-32)


 


Anion Gap    4 (6-14) 


 


Blood Urea Nitrogen


 


 


 


 16 mg/dL


(8-26)


 


Creatinine


 


 


 


 1.0 mg/dL


(0.7-1.3)


 


Estimated GFR


(Cockcroft-Gault) 


 


 


 80.1 





 


Glucose Level


 


 


 


 189 mg/dL


(70-99)


 


Calcium Level


 


 


 


 8.6 mg/dL


(8.5-10.1)


 


Test


 8/20/20


07:44 


 


 





 


Glucose (Fingerstick)


 163 mg/dL


(70-99) 


 


 














Review of Systems


Review of Systems


Left heel pain.  Denies drainage, denies swelling, denies fever.  All other 

systems negative.





Assessment and Plan


Assessmemt and Plan


(1) Left big toe ulcer


(2) Mild malnutrition





We will discharge patient on a short course of Augmentin twice daily for 7 days 

he is to have outpatient follow-up with wound care and regular dressing changes 

with Xeroform and gauze wrap.





Comment


Review of Relevant


I have reviewed the following items apurva (where applicable) has been applied.


Labs





Laboratory Tests








Test


 8/18/20


21:57 8/18/20


22:35 8/19/20


07:43 8/19/20


09:10


 


White Blood Count


 8.0 x10^3/uL


(4.0-11.0) 


 


 6.2 x10^3/uL


(4.0-11.0)


 


Red Blood Count


 3.76 x10^6/uL


(4.30-5.70) 


 


 3.83 x10^6/uL


(4.30-5.70)


 


Hemoglobin


 10.8 g/dL


(13.0-17.5) 


 


 10.9 g/dL


(13.0-17.5)


 


Hematocrit


 32.7 %


(39.0-53.0) 


 


 33.5 %


(39.0-53.0)


 


Mean Corpuscular Volume 87 fL ()    87 fL () 


 


Mean Corpuscular Hemoglobin 29 pg (25-35)    28 pg (25-35) 


 


Mean Corpuscular Hemoglobin


Concent 33 g/dL


(31-37) 


 


 33 g/dL


(31-37)


 


Red Cell Distribution Width


 16.8 %


(11.5-14.5) 


 


 17.2 %


(11.5-14.5)


 


Platelet Count


 245 x10^3/uL


(140-400) 


 


 216 x10^3/uL


(140-400)


 


Neutrophils (%) (Auto) 67 % (31-73)    74 % (31-73) 


 


Lymphocytes (%) (Auto) 22 % (24-48)    16 % (24-48) 


 


Monocytes (%) (Auto) 5 % (0-9)    6 % (0-9) 


 


Eosinophils (%) (Auto) 5 % (0-3)    5 % (0-3) 


 


Basophils (%) (Auto) 1 % (0-3)    0 % (0-3) 


 


Neutrophils # (Auto)


 5.3 x10^3/uL


(1.8-7.7) 


 


 4.5 x10^3/uL


(1.8-7.7)


 


Lymphocytes # (Auto)


 1.7 x10^3/uL


(1.0-4.8) 


 


 1.0 x10^3/uL


(1.0-4.8)


 


Monocytes # (Auto)


 0.4 x10^3/uL


(0.0-1.1) 


 


 0.4 x10^3/uL


(0.0-1.1)


 


Eosinophils # (Auto)


 0.4 x10^3/uL


(0.0-0.7) 


 


 0.3 x10^3/uL


(0.0-0.7)


 


Basophils # (Auto)


 0.1 x10^3/uL


(0.0-0.2) 


 


 0.0 x10^3/uL


(0.0-0.2)


 


Prothrombin Time


 13.8 SEC


(11.7-14.0) 


 


 





 


Prothromb Time International


Ratio 1.1 (0.8-1.1) 


 


 


 





 


Sodium Level


 136 mmol/L


(136-145) 


 


 138 mmol/L


(136-145)


 


Potassium Level


 4.1 mmol/L


(3.5-5.1) 


 


 4.3 mmol/L


(3.5-5.1)


 


Chloride Level


 99 mmol/L


() 


 


 99 mmol/L


()


 


Carbon Dioxide Level


 31 mmol/L


(21-32) 


 


 33 mmol/L


(21-32)


 


Anion Gap 6 (6-14)    6 (6-14) 


 


Blood Urea Nitrogen


 23 mg/dL


(8-26) 


 


 19 mg/dL


(8-26)


 


Creatinine


 1.2 mg/dL


(0.7-1.3) 


 


 1.0 mg/dL


(0.7-1.3)


 


Estimated GFR


(Cockcroft-Gault) 64.9 


 


 


 80.1 





 


BUN/Creatinine Ratio 19 (6-20)    


 


Glucose Level


 265 mg/dL


(70-99) 


 


 226 mg/dL


(70-99)


 


Lactic Acid Level


 2.2 mmol/L


(0.4-2.0) 


 


 1.5 mmol/L


(0.4-2.0)


 


Calcium Level


 9.3 mg/dL


(8.5-10.1) 


 


 8.8 mg/dL


(8.5-10.1)


 


Total Bilirubin


 0.3 mg/dL


(0.2-1.0) 


 


 





 


Aspartate Amino Transf


(AST/SGOT) 12 U/L (15-37) 


 


 


 





 


Alanine Aminotransferase


(ALT/SGPT) 20 U/L (16-63) 


 


 


 





 


Alkaline Phosphatase


 79 U/L


() 


 


 





 


Total Protein


 7.9 g/dL


(6.4-8.2) 


 


 





 


Albumin


 3.0 g/dL


(3.4-5.0) 


 


 





 


Albumin/Globulin Ratio 0.6 (1.0-1.7)    


 


Urine Collection Type  Unknown   


 


Urine Color  Yellow   


 


Urine Clarity  Clear   


 


Urine pH  7.5 (<5.0-8.0)   


 


Urine Specific Gravity


 


 1.020


(1.000-1.030) 


 





 


Urine Protein


 


 Negative mg/dL


(NEG-TRACE) 


 





 


Urine Glucose (UA)


 


 100 mg/dL


(NEG) 


 





 


Urine Ketones (Stick)


 


 Negative mg/dL


(NEG) 


 





 


Urine Blood  Negative (NEG)   


 


Urine Nitrite  Negative (NEG)   


 


Urine Bilirubin  Negative (NEG)   


 


Urine Urobilinogen Dipstick


 


 0.2 mg/dL (0.2


mg/dL) 


 





 


Urine Leukocyte Esterase  Negative (NEG)   


 


Urine RBC  0 /HPF (0-2)   


 


Urine WBC  0 /HPF (0-4)   


 


Urine Squamous Epithelial


Cells 


 Few /LPF 


 


 





 


Urine Bacteria  0 /HPF (0-FEW)   


 


Glucose (Fingerstick)


 


 


 168 mg/dL


(70-99) 





 


Test


 8/19/20


11:38 8/19/20


16:52 8/19/20


21:08 8/20/20


04:40


 


Glucose (Fingerstick)


 198 mg/dL


(70-99) 202 mg/dL


(70-99) 200 mg/dL


(70-99) 





 


White Blood Count


 


 


 


 7.6 x10^3/uL


(4.0-11.0)


 


Red Blood Count


 


 


 


 3.75 x10^6/uL


(4.30-5.70)


 


Hemoglobin


 


 


 


 10.7 g/dL


(13.0-17.5)


 


Hematocrit


 


 


 


 32.6 %


(39.0-53.0)


 


Mean Corpuscular Volume    87 fL () 


 


Mean Corpuscular Hemoglobin    29 pg (25-35) 


 


Mean Corpuscular Hemoglobin


Concent 


 


 


 33 g/dL


(31-37)


 


Red Cell Distribution Width


 


 


 


 16.9 %


(11.5-14.5)


 


Platelet Count


 


 


 


 240 x10^3/uL


(140-400)


 


Neutrophils (%) (Auto)    70 % (31-73) 


 


Lymphocytes (%) (Auto)    18 % (24-48) 


 


Monocytes (%) (Auto)    6 % (0-9) 


 


Eosinophils (%) (Auto)    5 % (0-3) 


 


Basophils (%) (Auto)    0 % (0-3) 


 


Neutrophils # (Auto)


 


 


 


 5.3 x10^3/uL


(1.8-7.7)


 


Lymphocytes # (Auto)


 


 


 


 1.4 x10^3/uL


(1.0-4.8)


 


Monocytes # (Auto)


 


 


 


 0.4 x10^3/uL


(0.0-1.1)


 


Eosinophils # (Auto)


 


 


 


 0.4 x10^3/uL


(0.0-0.7)


 


Basophils # (Auto)


 


 


 


 0.0 x10^3/uL


(0.0-0.2)


 


Sodium Level


 


 


 


 135 mmol/L


(136-145)


 


Potassium Level


 


 


 


 4.2 mmol/L


(3.5-5.1)


 


Chloride Level


 


 


 


 99 mmol/L


()


 


Carbon Dioxide Level


 


 


 


 32 mmol/L


(21-32)


 


Anion Gap    4 (6-14) 


 


Blood Urea Nitrogen


 


 


 


 16 mg/dL


(8-26)


 


Creatinine


 


 


 


 1.0 mg/dL


(0.7-1.3)


 


Estimated GFR


(Cockcroft-Gault) 


 


 


 80.1 





 


Glucose Level


 


 


 


 189 mg/dL


(70-99)


 


Calcium Level


 


 


 


 8.6 mg/dL


(8.5-10.1)


 


Test


 8/20/20


07:44 


 


 





 


Glucose (Fingerstick)


 163 mg/dL


(70-99) 


 


 











Laboratory Tests








Test


 8/19/20


11:38 8/19/20


16:52 8/19/20


21:08 8/20/20


04:40


 


Glucose (Fingerstick)


 198 mg/dL


(70-99) 202 mg/dL


(70-99) 200 mg/dL


(70-99) 





 


White Blood Count


 


 


 


 7.6 x10^3/uL


(4.0-11.0)


 


Red Blood Count


 


 


 


 3.75 x10^6/uL


(4.30-5.70)


 


Hemoglobin


 


 


 


 10.7 g/dL


(13.0-17.5)


 


Hematocrit


 


 


 


 32.6 %


(39.0-53.0)


 


Mean Corpuscular Volume    87 fL () 


 


Mean Corpuscular Hemoglobin    29 pg (25-35) 


 


Mean Corpuscular Hemoglobin


Concent 


 


 


 33 g/dL


(31-37)


 


Red Cell Distribution Width


 


 


 


 16.9 %


(11.5-14.5)


 


Platelet Count


 


 


 


 240 x10^3/uL


(140-400)


 


Neutrophils (%) (Auto)    70 % (31-73) 


 


Lymphocytes (%) (Auto)    18 % (24-48) 


 


Monocytes (%) (Auto)    6 % (0-9) 


 


Eosinophils (%) (Auto)    5 % (0-3) 


 


Basophils (%) (Auto)    0 % (0-3) 


 


Neutrophils # (Auto)


 


 


 


 5.3 x10^3/uL


(1.8-7.7)


 


Lymphocytes # (Auto)


 


 


 


 1.4 x10^3/uL


(1.0-4.8)


 


Monocytes # (Auto)


 


 


 


 0.4 x10^3/uL


(0.0-1.1)


 


Eosinophils # (Auto)


 


 


 


 0.4 x10^3/uL


(0.0-0.7)


 


Basophils # (Auto)


 


 


 


 0.0 x10^3/uL


(0.0-0.2)


 


Sodium Level


 


 


 


 135 mmol/L


(136-145)


 


Potassium Level


 


 


 


 4.2 mmol/L


(3.5-5.1)


 


Chloride Level


 


 


 


 99 mmol/L


()


 


Carbon Dioxide Level


 


 


 


 32 mmol/L


(21-32)


 


Anion Gap    4 (6-14) 


 


Blood Urea Nitrogen


 


 


 


 16 mg/dL


(8-26)


 


Creatinine


 


 


 


 1.0 mg/dL


(0.7-1.3)


 


Estimated GFR


(Cockcroft-Gault) 


 


 


 80.1 





 


Glucose Level


 


 


 


 189 mg/dL


(70-99)


 


Calcium Level


 


 


 


 8.6 mg/dL


(8.5-10.1)


 


Test


 8/20/20


07:44 


 


 





 


Glucose (Fingerstick)


 163 mg/dL


(70-99) 


 


 











Microbiology


8/19/20 Gram Stain - Final, Resulted


          


8/19/20 Aerobic Culture, Resulted


          Pending


Medications





Current Medications


Sodium Chloride 500 ml @  500 mls/hr 1X  ONCE IV  Last administered on 8/18/20at

21:43;  Start 8/18/20 at 21:30;  Stop 8/18/20 at 22:29;  Status DC


Vancomycin HCl (Vanco Per Pharmacy) 1 each PRN DAILY  PRN MC SEE COMMENTS Last 

administered on 8/19/20at 00:20;  Start 8/18/20 at 21:00;  Stop 8/19/20 at 

09:37;  Status DC


Piperacillin Sod/ Tazobactam Sod 4.5 gm/Sodium Chloride 100 ml @  200 mls/hr 1X 

ONCE IV  Last administered on 8/18/20at 21:32;  Start 8/18/20 at 21:30;  Stop 

8/18/20 at 21:59;  Status DC


Vancomycin HCl 2 gm/Sodium Chloride 500 ml @  250 mls/hr 1X  ONCE IV  Last 

administered on 8/18/20at 21:32;  Start 8/18/20 at 22:00;  Stop 8/18/20 at 

23:59;  Status DC


Piperacillin Sod/ Tazobactam Sod 4.5 gm/Sodium Chloride 100 ml @  200 mls/hr 

Q6HRS IV ;  Start 8/19/20 at 00:00;  Status Cancel


Hydromorphone HCl (Dilaudid) 1 mg 1X  ONCE IV  Last administered on 8/19/20at 

00:16;  Start 8/19/20 at 00:00;  Stop 8/19/20 at 00:01;  Status DC


Hydromorphone HCl (Dilaudid) 0.5 mg PRN Q4HRS  PRN IV SEVERE PAIN 7-10 Last 

administered on 8/20/20at 00:25;  Start 8/18/20 at 23:45


Piperacillin Sod/ Tazobactam Sod 4.5 gm/Sodium Chloride 100 ml @  200 mls/hr 

Q6HRS IV  Last administered on 8/20/20at 06:07;  Start 8/19/20 at 02:00


Vancomycin HCl 1.5 gm/Sodium Chloride 500 ml @  250 mls/hr Q8HRS IV  Last 

administered on 8/19/20at 06:43;  Start 8/19/20 at 06:00;  Stop 8/19/20 at 

09:37;  Status DC


Vancomycin HCl (Vancomycin Trough Level) 1 each 1X  ONCE MC ;  Start 8/19/20 at 

21:30;  Stop 8/19/20 at 09:37;  Status DC


Sodium Chloride 1,000 ml @  125 mls/hr 1X  ONCE IV  Last administered on 

8/19/20at 08:50;  Start 8/19/20 at 07:45;  Stop 8/19/20 at 15:44;  Status DC


Aspirin (Aspirin Chewable) 81 mg DAILY PO  Last administered on 8/20/20at 09:40;

 Start 8/19/20 at 12:00


Docusate Sodium (Colace) 100 mg PRN BID  PRN PO CONSTIPATION;  Start 8/19/20 at 

10:15


Gabapentin (Neurontin) 300 mg TID PO  Last administered on 8/20/20at 09:39;  

Start 8/19/20 at 14:00


Metoprolol Tartrate (Lopressor) 25 mg BID PO  Last administered on 8/20/20at 

09:40;  Start 8/19/20 at 11:00


Oxycodone/ Acetaminophen (Percocet 10/325) 1 tab PRN Q6HRS  PRN PO MODERATE TO 

SEVERE PAIN Last administered on 8/19/20at 12:16;  Start 8/19/20 at 10:15


Non-Formulary Medication (Albuterol Sulfate (Proventil Hfa Inhaler)) 2 puff BID 

IH ;  Start 8/19/20 at 21:00;  Status UNV


Non-Formulary Medication (Budesonide/ Formoterol Fumarate (Symbicort 160-4.5 Mcg

Inhaler)) 2 puff BID IH ;  Start 8/19/20 at 21:00;  Status UNV


Lisinopril (Prinivil) 20 mg DAILY PO  Last administered on 8/20/20at 09:39;  

Start 8/19/20 at 11:00


Atorvastatin Calcium (Lipitor) 5 mg QHS PO  Last administered on 8/19/20at 

21:09;  Start 8/19/20 at 21:00


Multivitamins (Thera M Plus) 1 tab DAILY PO  Last administered on 8/20/20at 

09:38;  Start 8/19/20 at 09:00


Lactobacillus Rhamnosus (Culturelle) 1 cap BID PO  Last administered on 

8/20/20at 09:38;  Start 8/19/20 at 21:00


Insulin Glargine (Lantus Syringe) 25 unit QHS SQ  Last administered on 8/19/20at

21:14;  Start 8/19/20 at 21:00


Insulin Human Lispro (HumaLOG) 0-7 UNITS TIDWMEALS SQ  Last administered on 

8/20/20at 07:59;  Start 8/19/20 at 12:00


Dextrose (Dextrose 50%-Water Syringe) 12.5 gm PRN Q15MIN  PRN IV SEE COMMENTS;  

Start 8/19/20 at 10:30


Insulin Human Lispro (HumaLOG) 8 units TIDWMEALS SQ  Last administered on 

8/20/20at 08:00;  Start 8/19/20 at 12:00


Hydrochlorothiazide (Hydrodiuril) 25 mg DAILY PO  Last administered on 8/20/20at

09:39;  Start 8/19/20 at 11:00


Albuterol Sulfate (Ventolin Neb Soln) 2.5 mg Q6HRS NEB  Last administered on 

8/19/20at 19:55;  Start 8/19/20 at 12:00;  Stop 8/19/20 at 21:35;  Status DC


Budesonide (Pulmicort) 0.5 mg RTBID NEB  Last administered on 8/20/20at 07:15;  

Start 8/19/20 at 11:00


Oxycodone HCl (Roxicodone) 10 mg PRN Q4HRS  PRN PO MODERATE PAIN Last 

administered on 8/20/20at 06:07;  Start 8/19/20 at 15:00


Morphine Sulfate (Morphine Sulfate) 4 mg PRN Q4HRS  PRN IV MODERATE TO SEVERE 

PAIN;  Start 8/19/20 at 15:00


Albuterol Sulfate (Ventolin Neb Soln) 2.5 mg RTQID NEB  Last administered on 

8/20/20at 07:15;  Start 8/20/20 at 08:00


Diphenhydramine HCl (Benadryl) 25 mg PRN Q6HRS  PRN PO ITCHING Last administered

on 8/20/20at 07:51;  Start 8/20/20 at 07:45





Active Scripts


Active


Naproxen 500 Mg Tablet 1 Tab PO BID PRN


Percocet  Mg Tablet ** (Oxycodone/Acetaminophen) 1 Each Tablet 1 Tab PO 

PRN Q6HRS PRN 14 Days


Colace (Docusate Sodium) 100 Mg Capsule 100 Mg PO PRN BID PRN 30 Days


Reported


Norco 5-325 Tablet (Acetaminophen/Hydrocodone Bitart) 1 Each Tablet 1 Tab PO PRN

BID PRN


Lantus Solostar (Insulin Glargine,Hum.rec.anlog) 100 Unit/1 Ml Insuln.pen 20 

Unit SQ QHS


Gabapentin ** (Gabapentin) 300 Mg Capsule 300 Mg PO TID


Janumet 50-1,000 Mg Tablet (Sitagliptin Phos/Metformin Hcl) 1 Each Tablet 1 Tab 

PO BID


Multivitamins (Multivitamin) 1 Each Tablet 1 Tab PO DAILY


Metoprolol Tartrate 25 Mg Tablet 1 Tab PO BID


Livalo (Pitavastatin Calcium) 4 Mg Tablet 4 Mg PO 


Aspirin 81 Mg Tab.chew 1 Tab PO DAILY


Iron Supplement (Ferrous Sulfate) 325 Mg Tablet 65 Mg PO 


Proventil Hfa Inhaler (Albuterol Sulfate) 6.7 Gm Hfa.aer.ad 2 Puff IH BID


Lisinopril-Hctz 20-25 Mg Tab (Lisinopril/Hydrochlorothiazide) 1 Each Tablet 1 

Tab PO DAILY


Lovastatin 20 Mg Tablet 10 Mg PO HS


Symbicort 160-4.5 Mcg Inhaler (Budesonide/Formoterol Fumarate) 10.2 Gm 

Hfa.aer.ad 2 Puff IH BID


Vitals/I & O





Vital Sign - Last 24 Hours








 8/19/20 8/19/20 8/19/20 8/19/20





 09:55 10:41 10:42 11:00


 


Temp    98.4





    98.4


 


Pulse  80 80 80


 


Resp    18


 


B/P (MAP)  144/68 144/68 143/69 (93)


 


Pulse Ox    98


 


O2 Delivery Room Air   Room Air


 


    





    





 8/19/20 8/19/20 8/19/20 8/19/20





 12:16 13:55 15:00 15:30


 


Temp   98.3 





   98.3 


 


Pulse   72 


 


Resp   16 


 


B/P (MAP)   118/56 (76) 


 


Pulse Ox   98 97


 


O2 Delivery Room Air Room Air Room Air Room Air


 


    





    





 8/19/20 8/19/20 8/19/20 8/19/20





 16:09 17:20 17:27 18:10


 


O2 Delivery Room Air Room Air Room Air Room Air





 8/19/20 8/19/20 8/19/20 8/19/20





 19:00 19:57 19:58 20:00


 


Temp 97.9   





 97.9   


 


Pulse 80   


 


Resp 18   


 


B/P (MAP) 112/46 (68)   


 


Pulse Ox 98 94 94 


 


O2 Delivery Room Air Room Air Room Air Room Air


 


    





    





 8/19/20 8/19/20 8/19/20 8/20/20





 21:09 22:49 23:00 00:24


 


Temp   97.5 





   97.5 


 


Pulse 80  88 


 


Resp   18 


 


B/P (MAP) 112/46  139/92 (108) 


 


Pulse Ox   98 


 


O2 Delivery  Room Air Room Air Room Air


 


    





    





 8/20/20 8/20/20 8/20/20 8/20/20





 00:25 00:55 03:00 06:07


 


Temp   97.5 





   97.5 


 


Pulse   86 


 


Resp   18 


 


B/P (MAP)   139/58 (85) 


 


Pulse Ox   97 


 


O2 Delivery Room Air Room Air Room Air Room Air


 


    





    





 8/20/20 8/20/20 8/20/20 8/20/20





 07:00 07:12 07:17 09:39


 


Temp 97.7   





 97.7   


 


Pulse 87   87


 


Resp 18   


 


B/P (MAP) 143/67 (92)   143/67


 


Pulse Ox 96  98 


 


O2 Delivery Room Air Room Air Room Air 


 


    





    





 8/20/20   





 09:40   


 


Pulse 87   


 


B/P (MAP) 143/67   














Intake and Output   


 


 8/19/20 8/19/20 8/20/20





 15:00 23:00 07:00


 


Intake Total 200 ml  480 ml


 


Balance 200 ml  480 ml











Justicifation of Admission Dx:


Justifications for Admission:


Justification of Admission Dx:  Yes











MARY LOU MO MD            Aug 20, 2020 10:00

## 2020-08-20 NOTE — NUR
Discharge orders placed. Discharge instructions/medications discussed with pt and mother. 
Demonstrated dressing change, xeroform and gauze roll. Mother voiced understanding. Supplies 
given to pt. Augmentin 875 #14 rx given to mother. Explained to caller he needs to fill rx 
and take a dose tonight. Discussed with pt he needs to make a f/u appt with wound clinic 
within the next 3 days. Pt voiced understanding of discharge instructions. JACKIE Santiago 
wheeled pt off unit without complications

## 2020-08-20 NOTE — NUR
SW following. Discussed with RN, plan for outpatient wound care appointments and oral abx. 
Dr. Shi plans to switch to oral abx and discharge home today. No further SW needs.

## 2020-08-20 NOTE — PDOC3
Discharge Summary


Visit Information


Date of Admission:  Aug 19, 2020


Date of Discharge:  Aug 20, 2020


Final Diagnosis


Diabetic foot ulcer, mild malnutrition





Brief Hospital Course


Allergies





                                    Allergies








Coded Allergies Type Severity Reaction Last Updated Verified


 


  No Known Drug Allergies    12/2/18 No








Vital Signs





Vital Signs








  Date Time  Temp Pulse Resp B/P (MAP) Pulse Ox O2 Delivery O2 Flow Rate FiO2


 


8/20/20 10:04   18   Room Air  


 


8/20/20 09:40  87  143/67    


 


8/20/20 07:17     98   


 


8/20/20 07:00 97.7       





 97.7       








Lab Results





Laboratory Tests








Test


 8/18/20


21:57 8/18/20


22:35 8/19/20


07:43 8/19/20


09:10


 


White Blood Count


 8.0 x10^3/uL


(4.0-11.0) 


 


 6.2 x10^3/uL


(4.0-11.0)


 


Red Blood Count


 3.76 x10^6/uL


(4.30-5.70) 


 


 3.83 x10^6/uL


(4.30-5.70)


 


Hemoglobin


 10.8 g/dL


(13.0-17.5) 


 


 10.9 g/dL


(13.0-17.5)


 


Hematocrit


 32.7 %


(39.0-53.0) 


 


 33.5 %


(39.0-53.0)


 


Mean Corpuscular Volume 87 fL ()    87 fL () 


 


Mean Corpuscular Hemoglobin 29 pg (25-35)    28 pg (25-35) 


 


Mean Corpuscular Hemoglobin


Concent 33 g/dL


(31-37) 


 


 33 g/dL


(31-37)


 


Red Cell Distribution Width


 16.8 %


(11.5-14.5) 


 


 17.2 %


(11.5-14.5)


 


Platelet Count


 245 x10^3/uL


(140-400) 


 


 216 x10^3/uL


(140-400)


 


Neutrophils (%) (Auto) 67 % (31-73)    74 % (31-73) 


 


Lymphocytes (%) (Auto) 22 % (24-48)    16 % (24-48) 


 


Monocytes (%) (Auto) 5 % (0-9)    6 % (0-9) 


 


Eosinophils (%) (Auto) 5 % (0-3)    5 % (0-3) 


 


Basophils (%) (Auto) 1 % (0-3)    0 % (0-3) 


 


Neutrophils # (Auto)


 5.3 x10^3/uL


(1.8-7.7) 


 


 4.5 x10^3/uL


(1.8-7.7)


 


Lymphocytes # (Auto)


 1.7 x10^3/uL


(1.0-4.8) 


 


 1.0 x10^3/uL


(1.0-4.8)


 


Monocytes # (Auto)


 0.4 x10^3/uL


(0.0-1.1) 


 


 0.4 x10^3/uL


(0.0-1.1)


 


Eosinophils # (Auto)


 0.4 x10^3/uL


(0.0-0.7) 


 


 0.3 x10^3/uL


(0.0-0.7)


 


Basophils # (Auto)


 0.1 x10^3/uL


(0.0-0.2) 


 


 0.0 x10^3/uL


(0.0-0.2)


 


Prothrombin Time


 13.8 SEC


(11.7-14.0) 


 


 





 


Prothromb Time International


Ratio 1.1 (0.8-1.1) 


 


 


 





 


Sodium Level


 136 mmol/L


(136-145) 


 


 138 mmol/L


(136-145)


 


Potassium Level


 4.1 mmol/L


(3.5-5.1) 


 


 4.3 mmol/L


(3.5-5.1)


 


Chloride Level


 99 mmol/L


() 


 


 99 mmol/L


()


 


Carbon Dioxide Level


 31 mmol/L


(21-32) 


 


 33 mmol/L


(21-32)


 


Anion Gap 6 (6-14)    6 (6-14) 


 


Blood Urea Nitrogen


 23 mg/dL


(8-26) 


 


 19 mg/dL


(8-26)


 


Creatinine


 1.2 mg/dL


(0.7-1.3) 


 


 1.0 mg/dL


(0.7-1.3)


 


Estimated GFR


(Cockcroft-Gault) 64.9 


 


 


 80.1 





 


BUN/Creatinine Ratio 19 (6-20)    


 


Glucose Level


 265 mg/dL


(70-99) 


 


 226 mg/dL


(70-99)


 


Lactic Acid Level


 2.2 mmol/L


(0.4-2.0) 


 


 1.5 mmol/L


(0.4-2.0)


 


Calcium Level


 9.3 mg/dL


(8.5-10.1) 


 


 8.8 mg/dL


(8.5-10.1)


 


Total Bilirubin


 0.3 mg/dL


(0.2-1.0) 


 


 





 


Aspartate Amino Transf


(AST/SGOT) 12 U/L (15-37) 


 


 


 





 


Alanine Aminotransferase


(ALT/SGPT) 20 U/L (16-63) 


 


 


 





 


Alkaline Phosphatase


 79 U/L


() 


 


 





 


Total Protein


 7.9 g/dL


(6.4-8.2) 


 


 





 


Albumin


 3.0 g/dL


(3.4-5.0) 


 


 





 


Albumin/Globulin Ratio 0.6 (1.0-1.7)    


 


Urine Collection Type  Unknown   


 


Urine Color  Yellow   


 


Urine Clarity  Clear   


 


Urine pH  7.5 (<5.0-8.0)   


 


Urine Specific Gravity


 


 1.020


(1.000-1.030) 


 





 


Urine Protein


 


 Negative mg/dL


(NEG-TRACE) 


 





 


Urine Glucose (UA)


 


 100 mg/dL


(NEG) 


 





 


Urine Ketones (Stick)


 


 Negative mg/dL


(NEG) 


 





 


Urine Blood  Negative (NEG)   


 


Urine Nitrite  Negative (NEG)   


 


Urine Bilirubin  Negative (NEG)   


 


Urine Urobilinogen Dipstick


 


 0.2 mg/dL (0.2


mg/dL) 


 





 


Urine Leukocyte Esterase  Negative (NEG)   


 


Urine RBC  0 /HPF (0-2)   


 


Urine WBC  0 /HPF (0-4)   


 


Urine Squamous Epithelial


Cells 


 Few /LPF 


 


 





 


Urine Bacteria  0 /HPF (0-FEW)   


 


Glucose (Fingerstick)


 


 


 168 mg/dL


(70-99) 





 


Test


 8/19/20


11:38 8/19/20


16:52 8/19/20


21:08 8/20/20


04:40


 


Glucose (Fingerstick)


 198 mg/dL


(70-99) 202 mg/dL


(70-99) 200 mg/dL


(70-99) 





 


White Blood Count


 


 


 


 7.6 x10^3/uL


(4.0-11.0)


 


Red Blood Count


 


 


 


 3.75 x10^6/uL


(4.30-5.70)


 


Hemoglobin


 


 


 


 10.7 g/dL


(13.0-17.5)


 


Hematocrit


 


 


 


 32.6 %


(39.0-53.0)


 


Mean Corpuscular Volume    87 fL () 


 


Mean Corpuscular Hemoglobin    29 pg (25-35) 


 


Mean Corpuscular Hemoglobin


Concent 


 


 


 33 g/dL


(31-37)


 


Red Cell Distribution Width


 


 


 


 16.9 %


(11.5-14.5)


 


Platelet Count


 


 


 


 240 x10^3/uL


(140-400)


 


Neutrophils (%) (Auto)    70 % (31-73) 


 


Lymphocytes (%) (Auto)    18 % (24-48) 


 


Monocytes (%) (Auto)    6 % (0-9) 


 


Eosinophils (%) (Auto)    5 % (0-3) 


 


Basophils (%) (Auto)    0 % (0-3) 


 


Neutrophils # (Auto)


 


 


 


 5.3 x10^3/uL


(1.8-7.7)


 


Lymphocytes # (Auto)


 


 


 


 1.4 x10^3/uL


(1.0-4.8)


 


Monocytes # (Auto)


 


 


 


 0.4 x10^3/uL


(0.0-1.1)


 


Eosinophils # (Auto)


 


 


 


 0.4 x10^3/uL


(0.0-0.7)


 


Basophils # (Auto)


 


 


 


 0.0 x10^3/uL


(0.0-0.2)


 


Sodium Level


 


 


 


 135 mmol/L


(136-145)


 


Potassium Level


 


 


 


 4.2 mmol/L


(3.5-5.1)


 


Chloride Level


 


 


 


 99 mmol/L


()


 


Carbon Dioxide Level


 


 


 


 32 mmol/L


(21-32)


 


Anion Gap    4 (6-14) 


 


Blood Urea Nitrogen


 


 


 


 16 mg/dL


(8-26)


 


Creatinine


 


 


 


 1.0 mg/dL


(0.7-1.3)


 


Estimated GFR


(Cockcroft-Gault) 


 


 


 80.1 





 


Glucose Level


 


 


 


 189 mg/dL


(70-99)


 


Calcium Level


 


 


 


 8.6 mg/dL


(8.5-10.1)


 


Test


 8/20/20


07:44 


 


 





 


Glucose (Fingerstick)


 163 mg/dL


(70-99) 


 


 











Laboratory Tests








Test


 8/19/20


11:38 8/19/20


16:52 8/19/20


21:08 8/20/20


04:40


 


Glucose (Fingerstick)


 198 mg/dL


(70-99) 202 mg/dL


(70-99) 200 mg/dL


(70-99) 





 


White Blood Count


 


 


 


 7.6 x10^3/uL


(4.0-11.0)


 


Red Blood Count


 


 


 


 3.75 x10^6/uL


(4.30-5.70)


 


Hemoglobin


 


 


 


 10.7 g/dL


(13.0-17.5)


 


Hematocrit


 


 


 


 32.6 %


(39.0-53.0)


 


Mean Corpuscular Volume    87 fL () 


 


Mean Corpuscular Hemoglobin    29 pg (25-35) 


 


Mean Corpuscular Hemoglobin


Concent 


 


 


 33 g/dL


(31-37)


 


Red Cell Distribution Width


 


 


 


 16.9 %


(11.5-14.5)


 


Platelet Count


 


 


 


 240 x10^3/uL


(140-400)


 


Neutrophils (%) (Auto)    70 % (31-73) 


 


Lymphocytes (%) (Auto)    18 % (24-48) 


 


Monocytes (%) (Auto)    6 % (0-9) 


 


Eosinophils (%) (Auto)    5 % (0-3) 


 


Basophils (%) (Auto)    0 % (0-3) 


 


Neutrophils # (Auto)


 


 


 


 5.3 x10^3/uL


(1.8-7.7)


 


Lymphocytes # (Auto)


 


 


 


 1.4 x10^3/uL


(1.0-4.8)


 


Monocytes # (Auto)


 


 


 


 0.4 x10^3/uL


(0.0-1.1)


 


Eosinophils # (Auto)


 


 


 


 0.4 x10^3/uL


(0.0-0.7)


 


Basophils # (Auto)


 


 


 


 0.0 x10^3/uL


(0.0-0.2)


 


Sodium Level


 


 


 


 135 mmol/L


(136-145)


 


Potassium Level


 


 


 


 4.2 mmol/L


(3.5-5.1)


 


Chloride Level


 


 


 


 99 mmol/L


()


 


Carbon Dioxide Level


 


 


 


 32 mmol/L


(21-32)


 


Anion Gap    4 (6-14) 


 


Blood Urea Nitrogen


 


 


 


 16 mg/dL


(8-26)


 


Creatinine


 


 


 


 1.0 mg/dL


(0.7-1.3)


 


Estimated GFR


(Cockcroft-Gault) 


 


 


 80.1 





 


Glucose Level


 


 


 


 189 mg/dL


(70-99)


 


Calcium Level


 


 


 


 8.6 mg/dL


(8.5-10.1)


 


Test


 8/20/20


07:44 


 


 





 


Glucose (Fingerstick)


 163 mg/dL


(70-99) 


 


 











Brief Hospital Course


Mr. Corrigan  is a 45 old male who presented with a painful blister to his left 

big toe for the past 4 days.  Associated serosanguineous drainage.  He was 

admitted for diabetic foot ulcer management.  He was started on broad-spectrum 

antibiotics, with a consult to wound care.  He was given appropriate wound care 

instructions, with outpatient wound care follow-up and short course of 

antibiotics.





Discharge Information


Condition at Discharge:  Improved


Follow Up:  Weeks


Disposition/Orders:  D/C to Home


Scheduled


Albuterol Sulfate (Proventil Hfa Inhaler) 6.7 Gm Hfa.aer.ad, 2 PUFF IH BID, #1 

Ref 3 (Reported)


   Entered as Reported by: CORI STINSON on 6/12/15 0919


   Last Action: Converted on 8/19/20 1016 by MARY LOU MO MD


Amoxicillin/Potassium Clav (Augmentin 875-125 Tablet) 1 Each Tablet, 1 TAB PO 

BID for cellulitis for 7 Days, #14 Ref 0


   Prescribed by: MARY LOU MO MD on 8/20/20 1013


   Last Action: Continued on 8/20/20 1019 by MARY LOU MO MD


Aspirin (Aspirin) 81 Mg Tab.chew, 1 TAB PO DAILY, #30 Ref 3 (Reported)


   Entered as Reported by: CORI STINSON on 6/12/15 0919


   Last Action: Continued on 8/19/20 1016 by MARY LOU MO MD


Budesonide/Formoterol Fumarate (Symbicort 160-4.5 Mcg Inhaler) 10.2 Gm 

Hfa.aer.ad, 2 PUFF IH BID, #10.6 Ref 3 (Reported)


   Entered as Reported by: CORI STINSON on 6/12/15 0915


   Last Action: Converted on 8/19/20 1016 by MARY LOU MO MD


Gabapentin (Gabapentin **) 300 Mg Capsule, 300 MG PO TID, (Reported)


   Entered as Reported by: DEIDRA STOKES on 10/25/18 1138


   Last Action: Continued on 8/19/20 1016 by MARY LOU MO MD


Insulin Glargine,Hum.rec.anlog (Lantus Solostar) 100 Unit/1 Ml Insuln.pen, 20 

UNIT SQ QHS, #15 Ref 5 (Reported)


   Entered as Reported by: DEIDRA STOKES on 10/25/18 1138


   Last Action: Reviewed on 8/19/20 0039 by GILL HAYDEN


Lisinopril/Hydrochlorothiazide (Lisinopril-Hctz 20-25 Mg Tab) 1 Each Tablet, 1 

TAB PO DAILY, #30 Ref 5 (Reported)


   Entered as Reported by: CORI STINSON on 6/12/15 0915


   Last Action: Converted on 8/19/20 1016 by MARY LOU MO MD


Lovastatin (Lovastatin) 20 Mg Tablet, 10 MG PO HS, (Reported)


   Entered as Reported by: CORI STINSON on 6/12/15 0915


   Last Action: Converted on 8/19/20 1016 by MARY LOU MO MD


Metoprolol Tartrate (Metoprolol Tartrate) 25 Mg Tablet, 1 TAB PO BID, #180 Ref 1

(Reported)


   Entered as Reported by: DEIDRA STOKES on 10/25/18 1138


   Last Action: Continued on 8/19/20 1016 by MARY LOU MO MD


Multivitamin (Multivitamins) 1 Each Tablet, 1 TAB PO DAILY, #90 Ref 3 (Reported)


   Entered as Reported by: DEIDRA STOKES on 10/25/18 1138


   Last Action: Converted on 8/19/20 1016 by MARY LOU MO MD


Sitagliptin Phos/Metformin Hcl (Janumet 50-1,000 Mg Tablet) 1 Each Tablet, 1 TAB

PO BID, #60 Ref 5 (Reported)


   Entered as Reported by: DEIDRA STOKES on 10/25/18 1138


   Last Action: HELD on 8/19/20 1016 by MARY LOU MO MD





Scheduled PRN


Docusate Sodium (Colace) 100 Mg Capsule, 100 MG PO PRN BID PRN for CONSTIPATION 

for 30 Days, #30


   Prescribed by: TERRELL OLIVARES MD on 12/4/18 1157


   Last Action: Continued on 8/19/20 1016 by MARY LOU MO MD


Hydrocodone/Apap 5-325 (Eagar 5-325 Tablet) 1 Each Tablet, 1 TAB PO PRN BID PRN 

for PAIN, Ref 0 (Reported)


   Entered as Reported by: COREEN HUANG RN on 12/2/18 0811


   Last Action: Continued on 8/20/20 1019 by MARY LOU MO MD


Naproxen (Naproxen) 500 Mg Tablet, 1 TAB PO BID PRN for PAIN, #30 Ref 1


   Prescribed by: EM MILLER D.O. on 4/14/20 1150


   Last Action: Continued on 8/20/20 1019 by MARY LOU MO MD


Oxycodone/Apap  (Percocet  Mg Tablet **) 1 Each Tablet, 1 TAB PO PRN

Q6HRS PRN for PAIN for 14 Days, #60 Ref 0


   Prescribed by: TERRELL OLIVARES MD on 12/4/18 1157


   Last Action: Continued on 8/19/20 1016 by MARY LOU MO MD





Miscellaneous Medications


Ferrous Sulfate (Iron Supplement) 325 Mg Tablet, 65 MG PO, (Reported)


   Entered as Reported by: CORI STINSON on 6/12/15 0919


   Last Action: Continued on 8/20/20 1019 by MARY LOU MO MD


Pitavastatin Calcium (Livalo) 4 Mg Tablet, 4 MG PO, (Reported)


   Entered as Reported by: DEIDRA STOKES on 10/25/18 1134


   Last Action: HELD on 8/19/20 1016 by MARY LOU MO MD





Justicifation of Admission Dx:


Justifications for Admission:


Justification of Admission Dx:  Yes











MARY LOU MO MD            Aug 20, 2020 10:25

## 2020-08-28 ENCOUNTER — HOSPITAL ENCOUNTER (OUTPATIENT)
Dept: HOSPITAL 61 - ECHO | Age: 45
Discharge: HOME | End: 2020-08-28
Attending: INTERNAL MEDICINE
Payer: MEDICAID

## 2020-08-28 DIAGNOSIS — I35.0: ICD-10-CM

## 2020-08-28 DIAGNOSIS — I51.7: ICD-10-CM

## 2020-08-28 DIAGNOSIS — I35.1: Primary | ICD-10-CM

## 2020-08-28 PROCEDURE — 93306 TTE W/DOPPLER COMPLETE: CPT

## 2020-08-31 NOTE — CARD
MR#: [id number]

Account#: [id number]

Accession#: [id number]

Date of Study: 08/28/2020

Ordering Physician: FIDEL RED, 

Referring Physician: FIDEL RED, 

Tech: Whitley Castillo





--------------- APPROVED REPORT --------------





EXAM: Two-dimensional and M-mode echocardiogram with Doppler and color Doppler.



Other Information 

Quality : FairHR: 100bpm

Technically limited study due to  Morbid obesity



INDICATION

Murmur 



RISK FACTORS

Hypertension 

Hyperlipidemia



2D DIMENSIONS 

RVDd2.8 (2.9-3.5cm)Left Atrium(2D)3.8 (1.6-4.0cm)

IVSd1.1 (0.7-1.1cm)Aortic Root(2D)2.8 (2.0-3.7cm)

LVDd5.4 (3.9-5.9cm)LVOT Diameter1.9 (1.8-2.4cm)

PWd1.2 (0.7-1.1cm)LVDs3.1 (2.5-4.0cm)

FS (%) 42.4 %.1 ml

LVEF(%)73.0 (>50%)



Aortic Valve

AoV Peak Gregg.280.2cm/sAoV VTI48.0cm

AO Peak GR.31.4mmHgLVOT Peak Gregg.133.4cm/s

LVOT  VTI 28.55cmAO Mean GR.19mmHg

RUBEN (VMAX)0.10sc2BYM   (VTI)1.64cm2



Pulmonary Valve

PV Peak Diaxucpo748.3cm/sPV Peak Grad.7mmHg



Tricuspid Valve

TR P. Nbrucdsf541tn/sRAP [id number]

TR Peak Gr.10wyZhKTGX17tmOy



 LEFT VENTRICLE 

The left ventricle is normal size. There is borderline to mild concentric left ventricular hypertroph
y. The left ventricular systolic function is normal and the ejection fraction is within normal range.
 The Ejection Fraction is 60-65%. There is normal LV segmental wall motion. Transmitral Doppler flow 
pattern is Grade II-pseudonormal filling dynamics.



 RIGHT VENTRICLE 

The right ventricle is mildly dilated. There is normal right ventricular wall thickness. The right ve
ntricular systolic function is normal.



 ATRIA 

The left atrium is moderately dilated. The right atrium is mildly dilated. The interatrial septum is 
intact with no evidence for an atrial septal defect or patent foramen ovale as noted on 2-D or Dopple
r imaging.



 AORTIC VALVE 

The aortic valve is not well visualized, it appears to be moderately calcified. Doppler and Color Chas
w revealed no significant aortic regurgitation. Calculated aortic valve area is 1.40 cm2 with maximum
 pressure gradient of 43 mmHg and mean pressure gradient of 27 mmHg. There is mild to moderate valvul
ar aortic stenosis.



 MITRAL VALVE 

The mitral valve is normal in structure and function. There is no evidence of mitral valve prolapse. 
There is no mitral valve stenosis. Doppler and Color-flow revealed trace mitral regurgitation.



 TRICUSPID VALVE 

The tricuspid valve is not well visualized. Doppler and Color Flow revealed trace tricuspid regurgita
tion with an estimated PAP of 25 mmHg. There is no tricuspid valve stenosis. 



 PULMONIC VALVE 

Not well visualized. Doppler and Color Flow revealed no pulmonic valvular regurgitation. There is no 
pulmonic valvular stenosis.



 GREAT VESSELS 

The aortic root is normal in size. The ascending aorta is normal in size. The IVC was not visualized.




 PERICARDIAL EFFUSION 

There is no evidence of significant pericardial effusion.



Critical Notification

Critical Value: No



<Conclusion>

The left ventricular systolic function is normal and the ejection fraction is within normal range. Th
e Ejection Fraction is 60-65%.

There is normal LV segmental wall motion.

The aortic valve is not well visualized, it appears to be moderately calcified.

Calculated aortic valve area is 1.40 cm2 with maximum pressure gradient of 43 mmHg and mean pressure 
gradient of 27 mmHg. There is mild to moderate valvular aortic stenosis.



Signed by : Fidel Red, 

Electronically Approved : 08/31/2020 09:43:53

## 2020-10-28 ENCOUNTER — HOSPITAL ENCOUNTER (OUTPATIENT)
Dept: HOSPITAL 61 - RAD | Age: 45
Discharge: HOME | End: 2020-10-28
Attending: FAMILY MEDICINE
Payer: MEDICAID

## 2020-10-28 DIAGNOSIS — Z79.899: ICD-10-CM

## 2020-10-28 DIAGNOSIS — Z79.84: ICD-10-CM

## 2020-10-28 DIAGNOSIS — Z87.891: ICD-10-CM

## 2020-10-28 DIAGNOSIS — Z79.82: ICD-10-CM

## 2020-10-28 DIAGNOSIS — F32.9: ICD-10-CM

## 2020-10-28 DIAGNOSIS — E78.00: ICD-10-CM

## 2020-10-28 DIAGNOSIS — I10: ICD-10-CM

## 2020-10-28 DIAGNOSIS — J44.9: ICD-10-CM

## 2020-10-28 DIAGNOSIS — K21.9: ICD-10-CM

## 2020-10-28 DIAGNOSIS — Z98.890: ICD-10-CM

## 2020-10-28 DIAGNOSIS — E11.9: ICD-10-CM

## 2020-10-28 DIAGNOSIS — F41.9: ICD-10-CM

## 2020-10-28 DIAGNOSIS — E66.9: ICD-10-CM

## 2020-10-28 DIAGNOSIS — M16.0: Primary | ICD-10-CM

## 2020-10-28 DIAGNOSIS — G47.30: ICD-10-CM

## 2020-10-28 PROCEDURE — 20610 DRAIN/INJ JOINT/BURSA W/O US: CPT

## 2020-10-28 PROCEDURE — 77002 NEEDLE LOCALIZATION BY XRAY: CPT

## 2020-10-28 NOTE — RAD
Examination: ARTHROCENT MJR JT ASP/INJ LEFT

 

History: Reason: OSTEOARTHRITIS / Spl. Instructions:  / History: 

 

Comparison/Correlation: None

 

Findings: Risks and benefits of left hip joint injection were discussed 

with the patient and informed consent was obtained. Fluoroscopy was used 

for a total of 2.5 minutes for both the right hip and left hip joint 

injections. Total of 4 images were acquired.

 

This procedure was technically challenging due to patient's body habitus.

 

Cleansing with ChloraPrep was performed at the left groin region for 

placement of the needle. A total of 8 cc 1 percent lidocaine was 

administered for local anesthetic. 20-gauge needle was placed under 

fluoroscopic guidance into the lateral left hip joint capsule. Needle was 

placed at the lateral base of the femoral neck. 1.5 cc of Omnipaque 300 

was administered to confirm needle placement. Subsequently, 80 mg of 

Depo-Medrol, 2 cc 0.5 percent Marcaine and 1 cc of 1 percent lidocaine was

administered under fluoroscopic guidance.

 

Impression:

Administration of Depo-Medrol, Marcaine, and lidocaine into the left hip 

joint capsule region.

 

Electronically signed by: Aaron Gordon MD (10/28/2020 4:32 PM) [id number]

## 2020-10-28 NOTE — RAD
Examination: ARTHROCENT MJR JT ASP/INJ RT

 

History: Reason: OSTEOARTHRITIS  2.5 MINUTES FLUORO / Spl. Instructions:  

/ History: 

 

Comparison/Correlation: None

 

Findings: Risks and benefits of fluoroscopically guided injection into the

right hip joint capsule were discussed with the patient and informed 

consent was obtained. Fluoroscopy was utilized for 2.5 minutes for both 

the right hip and left hip joint injections. Total of 3 fluoroscopic 

images were acquired.

 

This procedure was technically challenging due to patient body habitus.

 

ChloraPrep was utilized to findings the expected site of needle placement 

about the right groin region.

8 cc 1 percent lidocaine was administered. 22-gauge needle was placed near

the superior aspect of the right femoral head-neck junction. 2 cc 

Omnipaque 300 was injected to confirm needle placement. 80 mg Depo-Medrol 

and 2 cc of 0.5 percent Marcaine as well as 1 cc of 1 percent lidocaine 

was then administered. Patient tolerated procedure well without immediate 

complications.

 

Impression:

Administration of Depo-Medrol, Marcaine, and lidocaine about the level of 

the right femoral head neck junction.

 

Electronically signed by: Aaron Gordon MD (10/28/2020 4:29 PM) [id number]

## 2021-01-08 ENCOUNTER — HOSPITAL ENCOUNTER (EMERGENCY)
Dept: HOSPITAL 61 - ER | Age: 46
Discharge: HOME | End: 2021-01-08
Payer: MEDICAID

## 2021-01-08 VITALS — BODY MASS INDEX: 50.62 KG/M2 | HEIGHT: 66 IN | WEIGHT: 315 LBS

## 2021-01-08 VITALS — DIASTOLIC BLOOD PRESSURE: 65 MMHG | SYSTOLIC BLOOD PRESSURE: 137 MMHG

## 2021-01-08 DIAGNOSIS — E78.00: ICD-10-CM

## 2021-01-08 DIAGNOSIS — E11.9: ICD-10-CM

## 2021-01-08 DIAGNOSIS — I10: ICD-10-CM

## 2021-01-08 DIAGNOSIS — J44.9: ICD-10-CM

## 2021-01-08 DIAGNOSIS — M25.551: Primary | ICD-10-CM

## 2021-01-08 PROCEDURE — 99284 EMERGENCY DEPT VISIT MOD MDM: CPT

## 2021-01-08 PROCEDURE — 73502 X-RAY EXAM HIP UNI 2-3 VIEWS: CPT

## 2021-01-08 PROCEDURE — 72192 CT PELVIS W/O DYE: CPT

## 2021-01-08 PROCEDURE — 96372 THER/PROPH/DIAG INJ SC/IM: CPT

## 2021-01-08 NOTE — RAD
PQRS Compliance Statement:



One or more of the following individualized dose reduction techniques were utilized for this examinat
ion:  

1. Automated exposure control  

2. Adjustment of the mA and/or kV according to patient size  

3. Use of iterative reconstruction technique



CT PELVIS WO 1/8/2021 6:55 AM



Indication: Right hip pain



COMPARISON: None available.



TECHNIQUE: Multiple axial CT images of the pelvis were obtained without intravenous contrast. Coronal
 and sagittal reformats are provided.



FINDINGS:



Visualized kidneys are normal in appearance. No hydronephrosis. Urinary bladder is within normal limi
ts given degree of distention. Small nodular are normal in caliber. No bowel obstruction or inflammat
ion. Gallbladder is present. No suspicious pelvic mass. Small umbilical hernia containing fat. Left i
nguinal lymph node measures 9.5 mm by short axis. Bilateral nonenlarged lymph nodes are identified. R
ight common femoral lymph node measures 2.0 cm (series 3, image 61). There is a left external iliac l
ymph node measuring 1.4 cm (series 3, image 41). Right external iliac lymph node measures 1.7 cm (ser
ies 3, image 54).



There is no acute fracture or dislocation. There is severe superolateral femoral acetabular joint spa
ce narrowing with subcortical sclerosis and subcortical cystic change along the superolateral aspect 
of the acetabulum. No definite fracture is visualized. Extensive femoral and acetabular marginal oste
ophytosis identified. Findings compatible with severe osteoarthrosis. Greater and lesser trochanters 
are intact. Pelvic ring is intact. Superior and inferior pubic rami are intact. Sacroiliac joints and
 pubic symphysis are well aligned. Visualized portions of the lower lumbar spine appear normal withou
t compression deformity.



IMPRESSION:

1. No acute fracture or dislocation. Severe osteoarthrosis the right hip.

2. Enlarged pelvic lymphadenopathy. Findings are nonspecific, however may be pathologic and further c
haracterization with CT abdomen/pelvis with contrast, PET/CT or 3 month follow-up pelvic CT is recomm
ended.



Electronically signed by: Ebony Wyatt MD (1/8/2021 7:18 AM) [id number]

## 2021-01-08 NOTE — ED.ADGEN
Past Medical History


Past Medical History:  Anemia, COPD, Diabetes-Type II, High Cholesterol, H

ypertension, Other


Additional Past Medical Histor:  neuropathy, degenerative rheumatoid arthritis 

bilateral hip/back


Past Surgical History:  No Surgical History


Smoking Status:  Never Smoker


Alcohol Use:  Rarely


Drug Use:  None





General Adult


EDM:


Chief Complaint:  HIP PAIN





HPI:


HPI:





Patient is a 45  year old male coming in accompanied with his by his mother for 

right hip pain.  Yesterday morning, about 20 hours prior to arrival, , He was 

getting out of the shower and felt a "pop" in his right hip.  Has been able to 

ambulate with pain.  Patient states the pain is worse with standing and a little

better with sitting but it starts to gradually increase.  Took one of his 

hydrocodone's about 2:30 AM.  History of chronic osteoarthritis of both hips.  

Patient's last hip steroid injections were in October.  He also has a wound to 

his right shin that his mom has been doing wound care on.  Wound started as a 

scrape to the leg.


Denies any recent illness, fevers, cough, vomiting, diarrhea.





Review of Systems:


Review of Systems:


All other systems within normal limits except for as noted in the HPI





Allergies:


Allergies:





Allergies








Coded Allergies Type Severity Reaction Last Updated Verified


 


  No Known Drug Allergies    12/2/18 No











Physical Exam:


PE:


Constitutional: Well developed, well nourished, no acute distress, non-toxic 

appearance, obese. []


HENT: Normocephalic, atraumatic, bilateral external ears normal,  nose normal. 

[]


Eyes: PERRLA, conjunctiva normal, no discharge. [] 


Neck: No rigidity, supple, no stridor. [] 


Cardiovascular: Regular rate and rhythm, brisk cap refill []


Lungs & Thorax: Non labored symmetric respirations, no tachypnea or respiratory 

distress []


Abdomen: Soft, nondistended.


Skin: Warm, dry, no erythema, no rash.,  Wound with surrounding erythema to 

right shin [] 


Back: No tenderness, no CVA tenderness. [] 


Extremities: No deformities, range of motion grossly intact, no lower extremity 

edema [] 


Neurologic: Alert and oriented X 3, no focal deficits noted. []


Psychologic: Affect normal, judgement normal, mood normal. []





Current Patient Data:


Vital Signs:





                                   Vital Signs








  Date Time  Temp Pulse Resp B/P (MAP) Pulse Ox O2 Delivery O2 Flow Rate FiO2


 


1/8/21 05:13 97.6 96 23 120/76 (91) 97 Room Air  





 97.6       











EKG:


EKG:


[]





Heart Score:


Risk Factors:


Risk Factors:  DM, Current or recent (<one month) smoker, HTN, HLP, family 

history of CAD, obesity.


Risk Scores:


Score 0 - 3:  2.5% MACE over next 6 weeks - Discharge Home


Score 4 - 6:  20.3% MACE over next 6 weeks - Admit for Clinical Observation


Score 7 - 10:  72.7% MACE over next 6 weeks - Early Invasive Strategies





Radiology/Procedures:


Radiology/Procedures:


[]





Course & Med Decision Making:


Course & Med Decision Making


Pertinent Labs and Imaging studies reviewed. (See chart for details)





[]





Dragon Disclaimer:


Dragon Disclaimer:


This electronic medical record was generated, in whole or in part, using a voice

 recognition dictation system.





Departure


Departure


Referrals:  


BOOGIE RINCON MD (PCP)











JUAN ANTONIO BLACK MD               Jan 8, 2021 05:35

## 2021-01-08 NOTE — RAD
XR BILATERAL HIP (WITH OR WITHOUT PELVIS) 2 VIEWS_RIGHT 1/8/2021 5:41 AM



INDICATION: Right hip pain



COMPARISON: None available.



TECHNIQUE:  AP view the pelvis and 2 dedicated views the right hip are provided.



FINDINGS/

IMPRESSION:

Evaluation of the mid segment body habitus and technique. There is no acute fracture or dislocation. 
Mild/moderate joint space narrowing involving the right femoral acetabular joint compatible with mild
 to moderate osteoarthrosis. There is decreased femoral head neck offset which may be associated with
 femoral acetabular impingement.



Pelvic ring is intact. Sacroiliac joints and pubic symphysis are well aligned. Superior and inferior 
pubic rami are intact. No definite acetabular fracture. Iliac bones are intact.



Electronically signed by: Ebony Wyatt MD (1/8/2021 7:13 AM) [id number]

## 2021-03-20 ENCOUNTER — HOSPITAL ENCOUNTER (EMERGENCY)
Dept: HOSPITAL 61 - ER | Age: 46
Discharge: HOME | End: 2021-03-20
Payer: MEDICAID

## 2021-03-20 VITALS — WEIGHT: 315 LBS | HEIGHT: 66 IN | BODY MASS INDEX: 50.62 KG/M2

## 2021-03-20 VITALS
SYSTOLIC BLOOD PRESSURE: 109 MMHG | DIASTOLIC BLOOD PRESSURE: 52 MMHG | SYSTOLIC BLOOD PRESSURE: 109 MMHG | SYSTOLIC BLOOD PRESSURE: 109 MMHG | DIASTOLIC BLOOD PRESSURE: 52 MMHG | DIASTOLIC BLOOD PRESSURE: 52 MMHG

## 2021-03-20 DIAGNOSIS — E11.621: Primary | ICD-10-CM

## 2021-03-20 DIAGNOSIS — J44.9: ICD-10-CM

## 2021-03-20 DIAGNOSIS — E11.40: ICD-10-CM

## 2021-03-20 DIAGNOSIS — I10: ICD-10-CM

## 2021-03-20 DIAGNOSIS — E78.00: ICD-10-CM

## 2021-03-20 LAB
ALBUMIN SERPL-MCNC: 3.2 G/DL (ref 3.4–5)
ALBUMIN/GLOB SERPL: 0.6 {RATIO} (ref 1–1.7)
ALP SERPL-CCNC: 80 U/L (ref 46–116)
ALT SERPL-CCNC: 26 U/L (ref 16–63)
ANION GAP SERPL CALC-SCNC: 11 MMOL/L (ref 6–14)
AST SERPL-CCNC: 15 U/L (ref 15–37)
BASOPHILS # BLD AUTO: 0 X10^3/UL (ref 0–0.2)
BASOPHILS NFR BLD: 1 % (ref 0–3)
BILIRUB SERPL-MCNC: 0.3 MG/DL (ref 0.2–1)
BUN SERPL-MCNC: 63 MG/DL (ref 8–26)
BUN/CREAT SERPL: 45 (ref 6–20)
CALCIUM SERPL-MCNC: 9.3 MG/DL (ref 8.5–10.1)
CHLORIDE SERPL-SCNC: 99 MMOL/L (ref 98–107)
CO2 SERPL-SCNC: 25 MMOL/L (ref 21–32)
CREAT SERPL-MCNC: 1.4 MG/DL (ref 0.7–1.3)
EOSINOPHIL NFR BLD: 0.3 X10^3/UL (ref 0–0.7)
EOSINOPHIL NFR BLD: 4 % (ref 0–3)
ERYTHROCYTE [DISTWIDTH] IN BLOOD BY AUTOMATED COUNT: 19 % (ref 11.5–14.5)
GFR SERPLBLD BASED ON 1.73 SQ M-ARVRAT: 54.3 ML/MIN
GLUCOSE SERPL-MCNC: 220 MG/DL (ref 70–99)
HCT VFR BLD CALC: 29.9 % (ref 39–53)
HGB BLD-MCNC: 9.6 G/DL (ref 13–17.5)
LIPASE: 268 U/L (ref 73–393)
LYMPHOCYTES # BLD: 1.4 X10^3/UL (ref 1–4.8)
LYMPHOCYTES NFR BLD AUTO: 23 % (ref 24–48)
MAGNESIUM SERPL-MCNC: 1.7 MG/DL (ref 1.8–2.4)
MCH RBC QN AUTO: 28 PG (ref 25–35)
MCHC RBC AUTO-ENTMCNC: 32 G/DL (ref 31–37)
MCV RBC AUTO: 88 FL (ref 79–100)
MONO #: 0.4 X10^3/UL (ref 0–1.1)
MONOCYTES NFR BLD: 7 % (ref 0–9)
NEUT #: 4.2 X10^3/UL (ref 1.8–7.7)
NEUTROPHILS NFR BLD AUTO: 66 % (ref 31–73)
PLATELET # BLD AUTO: 209 X10^3/UL (ref 140–400)
POTASSIUM SERPL-SCNC: 4.8 MMOL/L (ref 3.5–5.1)
PROT SERPL-MCNC: 8.3 G/DL (ref 6.4–8.2)
RBC # BLD AUTO: 3.41 X10^6/UL (ref 4.3–5.7)
SODIUM SERPL-SCNC: 135 MMOL/L (ref 136–145)
WBC # BLD AUTO: 6.3 X10^3/UL (ref 4–11)

## 2021-03-20 PROCEDURE — 73630 X-RAY EXAM OF FOOT: CPT

## 2021-03-20 PROCEDURE — 80053 COMPREHEN METABOLIC PANEL: CPT

## 2021-03-20 PROCEDURE — 83605 ASSAY OF LACTIC ACID: CPT

## 2021-03-20 PROCEDURE — 84484 ASSAY OF TROPONIN QUANT: CPT

## 2021-03-20 PROCEDURE — 96374 THER/PROPH/DIAG INJ IV PUSH: CPT

## 2021-03-20 PROCEDURE — 36415 COLL VENOUS BLD VENIPUNCTURE: CPT

## 2021-03-20 PROCEDURE — 85651 RBC SED RATE NONAUTOMATED: CPT

## 2021-03-20 PROCEDURE — 71045 X-RAY EXAM CHEST 1 VIEW: CPT

## 2021-03-20 PROCEDURE — 83690 ASSAY OF LIPASE: CPT

## 2021-03-20 PROCEDURE — 93005 ELECTROCARDIOGRAM TRACING: CPT

## 2021-03-20 PROCEDURE — 86140 C-REACTIVE PROTEIN: CPT

## 2021-03-20 PROCEDURE — 83735 ASSAY OF MAGNESIUM: CPT

## 2021-03-20 PROCEDURE — 85025 COMPLETE CBC W/AUTO DIFF WBC: CPT

## 2021-03-20 PROCEDURE — 99285 EMERGENCY DEPT VISIT HI MDM: CPT

## 2021-03-20 NOTE — EKG
[facility]

              [address] [place] [address]

Test Date:    2021               Test Time:    08:43:27

Pat Name:     CHRISTOPHER GOOD           Department:   

Patient ID:   [id number]           Room:          

Gender:       M                        Technician:   

:          1975               Requested By: REJI POTTS

Order Number: [id number]           Reading MD:     

                                 Measurements

Intervals                              Axis          

Rate:         100                      P:            21

NH:           218                      QRS:          65

QRSD:         84                       T:            43

QT:           304                                    

QTc:          395                                    

                           Interpretive Statements

SINUS RHYTHM

PROLONGED NH INTERVAL

ABNORMAL ECG

RI6.02

No previous ECG available for comparison

## 2021-03-20 NOTE — PHYS DOC
Past Medical History


Past Medical History:  Anemia, COPD, Diabetes-Type II, High Cholesterol, 

Hypertension, Other


Additional Past Medical Histor:  neuropathy, degenerative ra lily hip/back, heart

valve only opens 1/2 way


Past Surgical History:  No Surgical History


Smoking Status:  Never Smoker


Alcohol Use:  Rarely


Drug Use:  None





Adult General


Chief Complaint


Chief Complaint:  MULTIPLE COMPLAINTS





HPI


HPI





Patient is a 45  year old with a past medical history which includes hyperte

nsion, hyperlipidemia, anemia and diabetes now presents emergency department 

complaining of new onset of dizziness and toe pain.  Patient states that over 

the last 3 days he has been having intermittent sensation of lightheadedness and

feeling mildly dizzy.  Patient states that he was was with his sister last night

who is a nurse and took his blood pressure noted that his blood pressure was low

with a systolic of 90.  Patient states that spontaneously improved.  Patient 

also notes that over the last week he has been having worsening changes in the 

left great toe.  Patient states that he was seen and evaluated by physician 

about a week ago as he was concerned it was infected.  Patient states that he 

was fine with it yesterday noted with smelly discharge emanating from it.  

Denies any fever, chills, nausea or vomiting





Review of Systems


Review of Systems





Constitutional: Denies fever or chills []


Eyes: Denies change in visual acuity, redness, or eye pain []


HENT: Denies nasal congestion or sore throat []


Respiratory: Denies cough or shortness of breath []


Cardiovascular: No additional information not addressed in HPI []


GI: Denies abdominal pain, nausea, vomiting, bloody stools or diarrhea []


:  Denies dysuria or hematuria []


Musculoskeletal: Denies back pain or joint pain []


Integument: Denies rash or skin lesions []


Neurologic: Denies headache, focal weakness or sensory changes []


Endocrine: Denies polyuria or polydipsia []





All other systems were reviewed and found to be within normal limits, except as 

documented in this note.





Current Medications


Current Medications





Current Medications








 Medications


  (Trade)  Dose


 Ordered  Sig/Danielito  Start Time


 Stop Time Status Last Admin


Dose Admin


 


 Morphine Sulfate


  (Morphine


 Sulfate)  4 mg  1X  ONCE  3/20/21 10:00


 3/20/21 10:09 DC 3/20/21 10:22


4 MG











Allergies


Allergies





Allergies








Coded Allergies Type Severity Reaction Last Updated Verified


 


  No Known Drug Allergies    12/2/18 No











Physical Exam


Physical Exam





Constitutional: Well developed, well nourished, no acute distress, non-toxic 

appearance. []


HENT: Normocephalic, atraumatic, bilateral external ears normal, oropharynx 

moist, no oral exudates, nose normal. []


Eyes: PERRLA, EOMI, conjunctiva normal, no discharge. [] 


Neck: Normal range of motion, no tenderness, supple, no stridor. [] 


Cardiovascular:Heart rate regular rhythm, no murmur []


Lungs & Thorax:  Bilateral breath sounds clear to auscultation []


Abdomen: Bowel sounds normal, soft, no tenderness, no masses, no pulsatile 

masses. [] 


Skin: Warm, dry, no erythema, no rash. [] 


Back: No tenderness, no CVA tenderness. [] 


Extremities: No tenderness, no cyanosis, no clubbing, ROM intact, no edema. R 

great etienne with small area of discoloration at the distal aspect with minimal 

tenderness ,no erythema [] 


Neurologic: Alert and oriented X 3, normal motor function, normal sensory 

function, no focal deficits noted. []


Psychologic: Affect normal, judgement normal, mood normal. []





Current Patient Data


Vital Signs





                                   Vital Signs








  Date Time  Temp Pulse Resp B/P (MAP) Pulse Ox O2 Delivery O2 Flow Rate FiO2


 


3/20/21 10:22   18  99 Room Air  


 


3/20/21 09:54    108/55 (72)    


 


3/20/21 08:57 98.7 99      





 98.7       








Lab Values





                                Laboratory Tests








Test


 3/20/21


09:19 3/20/21


11:13


 


White Blood Count


 6.3 x10^3/uL


(4.0-11.0) 





 


Red Blood Count


 3.41 x10^6/uL


(4.30-5.70)  L 





 


Hemoglobin


 9.6 g/dL


(13.0-17.5)  L 





 


Hematocrit


 29.9 %


(39.0-53.0)  L 





 


Mean Corpuscular Volume


 88 fL ()


 





 


Mean Corpuscular Hemoglobin 28 pg (25-35)   


 


Mean Corpuscular Hemoglobin


Concent 32 g/dL


(31-37) 





 


Red Cell Distribution Width


 19.0 %


(11.5-14.5)  H 





 


Platelet Count


 209 x10^3/uL


(140-400) 





 


Neutrophils (%) (Auto) 66 % (31-73)   


 


Lymphocytes (%) (Auto) 23 % (24-48)  L 


 


Monocytes (%) (Auto) 7 % (0-9)   


 


Eosinophils (%) (Auto) 4 % (0-3)  H 


 


Basophils (%) (Auto) 1 % (0-3)   


 


Neutrophils # (Auto)


 4.2 x10^3/uL


(1.8-7.7) 





 


Lymphocytes # (Auto)


 1.4 x10^3/uL


(1.0-4.8) 





 


Monocytes # (Auto)


 0.4 x10^3/uL


(0.0-1.1) 





 


Eosinophils # (Auto)


 0.3 x10^3/uL


(0.0-0.7) 





 


Basophils # (Auto)


 0.0 x10^3/uL


(0.0-0.2) 





 


Erythrocyte Sedimentation Rate 96 (0-15)  H 


 


Sodium Level


 135 mmol/L


(136-145)  L 





 


Potassium Level


 4.8 mmol/L


(3.5-5.1) 





 


Chloride Level


 99 mmol/L


() 





 


Carbon Dioxide Level


 25 mmol/L


(21-32) 





 


Anion Gap 11 (6-14)   


 


Blood Urea Nitrogen


 63 mg/dL


(8-26)  H 





 


Creatinine


 1.4 mg/dL


(0.7-1.3)  H 





 


Estimated GFR


(Cockcroft-Gault) 54.3  


 





 


BUN/Creatinine Ratio 45 (6-20)  H 


 


Glucose Level


 220 mg/dL


(70-99)  H 





 


Calcium Level


 9.3 mg/dL


(8.5-10.1) 





 


Magnesium Level


 1.7 mg/dL


(1.8-2.4)  L 





 


Total Bilirubin


 0.3 mg/dL


(0.2-1.0) 





 


Aspartate Amino Transferase


(AST) 15 U/L (15-37)


 





 


Alanine Aminotransferase (ALT)


 26 U/L (16-63)


 





 


Alkaline Phosphatase


 80 U/L


() 





 


Troponin I Quantitative


 < 0.017 ng/mL


(0.000-0.055) 





 


C-Reactive Protein,


Quantitative 28.6 mg/L


(0-3.3)  H 





 


Total Protein


 8.3 g/dL


(6.4-8.2)  H 





 


Albumin


 3.2 g/dL


(3.4-5.0)  L 





 


Albumin/Globulin Ratio


 0.6 (1.0-1.7)


L 





 


Lipase


 268 U/L


() 





 


Lactic Acid Level


 


 1.5 mmol/L


(0.4-2.0)





                                Laboratory Tests


3/20/21 09:19








                                Laboratory Tests


3/20/21 09:19














EKG


EKG


[]





Radiology/Procedures


Radiology/Procedures


[]





Course & Med Decision Making


Course & Med Decision Making


Pertinent Labs and Imaging studies reviewed. (See chart for details)





45M with complaint of dizziness and what appears to be a new onset of upgrade to

 infection that does raise concern for acute osteomyelitis.  We will monitor the

 patient for any changes in vital signs and obtain labs to make sure there is no

 evidence of underlying cardiac abnormality.  Will obtain an x-ray to make sure 

there is no evidence of osteomyelitis.





12:36 -patient remains asymptomatic without any changes in vital signs and 

telemetry monitoring.  Labs unremarkable and x-ray without evidence of 

osteomyelitis.  This time will discharge the patient home with course of 

Bactrim.  Patient verbalizes understanding agreement with discharge plan





Dragon Disclaimer


Dragon Disclaimer


This electronic medical record was generated, in whole or in part, using a voice

 recognition dictation system.





Departure


Departure


Impression:  


   Primary Impression:  


   Diabetic foot ulcer


Disposition:  01 DC HOME SELF CARE/HOMELESS


Condition:  GOOD


Referrals:  


BOOGIE RINCON MD (PCP)


Patient Instructions:  Wound Care, Easy-to-Read





Additional Instructions:  


EMERGENCY DEPARTMENT GENERAL DISCHARGE INSTRUCTIONS





Thank you for coming to [facility] Emergency Department (ED) 

today and 


trusting us with you care.  We trust that you had a positive experience in our 

Emergency 


Department.  If you wish to speak to the department management, you may call the

 Director at 


[phone].





YOUR FOLLOW UP INSTRUCTIONS ARE AS FOLLOWS:





1.  Do you have a private Doctor?  If you do not have a private doctor, please 

ask for a 


resource list of physicians or clinics that may be able to assist you with 

follow up care.





2.  The Emergency Physicain has interpreted your x-rays.  The X-Ray specialist 

will also 


review them.  If there is a change in the findings, you will be notified in 48 

hours when at 


all possible.





3.  A lab test or culture has been done, your results will be reviewed and you 

will be 


notified if you need a change in treatment.





ADDITIONAL INSTRUCTIONS AND INFORMATION:





1.  Your care today has been supervised by a physician who is specially trained 

in emergency 


care.  Many problems require more than one evaluation for a complete diagnosis 

and 


treatment.  We recommend that you schedule your follow up appointment as 

recommended to 


ensure complete treatment of you illness or injury.  If you are unable to obtain

 follow up 


care and continue to have a problem, or if your condition worsens, we recommend 

that you 


return to the ED.





2.  We are not able to safely determine your condition over the phone nor are we

 able to 


give sound medical advice over the phone.  For these safety reasons, if you call

 for medical 


advice we will ask you to come to the ED for further evaluation.





3.  If you have any questions regarding these discharge instructions please call

 the ED at 


[phone].





SAFETY INFORMATION:





In the interest of safety, wellness, and injury prevention; we encourage you to 

wear your 


sealbelt, if you smoke; quite smoking, and we encourage family to use a protect

fabio helmet 


for bicycling and other sporting events that present an increased risk for head 

injury.





IF YOUR SYMPTOMS WORSEN OR NEW SYMPTOMS DEVELOP, OR YOU HAVE CONCERNS ABOUT YOUR

 CONDITION; 


OR IF YOUR CONDITION WORSENS WHILE YOU ARE WAITING FOR YOUR FOLLOW UP 

APPOINTMENT; EITHER 


CONTACT YOUR PRIMARY CARE DOCTOR, THE PHYSICIAN WHOSE NAME AND NUMBER YOU WERE 

GIVEN, OR 


RETURN TO THE ED IMMEDIATELY.


Scripts


Sulfamethoxazole/Trimethoprim (BACTRIM DS TABLET) 1 Each Tablet


1 TAB PO BID for infection, #14 TAB


   Prov: REJI POTTS MD         3/20/21











REJI POTTS MD              Mar 20, 2021 12:39

## 2021-03-20 NOTE — RAD
XR CHEST 1V



History: Reason: chest pain / Spl. Instructions:  / History: 



Comparison: April 14, 2020



Findings:

Linear left midlung atelectasis or scarring. No consolidation or pleural effusion. Normal heart size.
 No pneumothorax.



Impression: 

1.  No acute cardiopulmonary process.



Electronically signed by: Gentry Pineda DO (3/20/2021 9:18 AM) [facility] [place] [place]

## 2021-03-20 NOTE — RAD
Exam performed: Left foot 3 views. 



Clinical Indication: Left great toe infection. 



Date of Service:3/20/2021.  



Comparison :None available



Findings:



PA, oblique and lateral radiographs of the foot reveal the osseous structures to be intact and well a
ligned. There is no bony erosion or periosteal reaction. There is diffuse soft tissue swelling around
 the first toe and the medial aspect of the foot. The joint spaces are well preserved and the articul
ar margins are smooth.



Impression: 



1.  Diffuse soft tissue swelling without underlying bony abnormality of the left foot.

2.  Findings are consistent with cellulitis.



Electronically signed by: Kelsie Pearce MD (3/20/2021 9:56 AM) [id number]

## 2021-03-20 NOTE — EKG
[facility]

              [address] [place] [address]

Test Date:    2021               Test Time:    09:56:54

Pat Name:     CHRISTOPHER GOOD           Department:   

Patient ID:   [id number]           Room:          

Gender:       M                        Technician:   

:          1975               Requested By: REJI POTTS

Order Number: [id number]           Reading MD:     

                                 Measurements

Intervals                              Axis          

Rate:         83                       P:            30

WI:           208                      QRS:          45

QRSD:         84                       T:            35

QT:           328                                    

QTc:          390                                    

                           Interpretive Statements

SINUS RHYTHM

NORMAL ECG

RI6.01

No previous ECG available for comparison

## 2021-03-22 NOTE — EKG
[facility]

              [address] [place] [address]

Test Date:    2021               Test Time:    09:56:54

Pat Name:     CHRISTOPHER GOOD           Department:   

Patient ID:   [id number]           Room:          

Gender:       M                        Technician:   

:          1975               Requested By: REJI POTTS

Order Number: [id number]           Reading MD:     

                                 Measurements

Intervals                              Axis          

Rate:         83                       P:            30

NE:           208                      QRS:          45

QRSD:         84                       T:            35

QT:           328                                    

QTc:          390                                    

                           Interpretive Statements

SINUS RHYTHM

NORMAL ECG

RI6.01

No previous ECG available for comparison

## 2021-04-26 ENCOUNTER — HOSPITAL ENCOUNTER (OUTPATIENT)
Dept: HOSPITAL 61 - RAD | Age: 46
Discharge: HOME | End: 2021-04-26
Attending: NURSE PRACTITIONER
Payer: MEDICAID

## 2021-04-26 DIAGNOSIS — F32.9: ICD-10-CM

## 2021-04-26 DIAGNOSIS — Z79.82: ICD-10-CM

## 2021-04-26 DIAGNOSIS — Z79.899: ICD-10-CM

## 2021-04-26 DIAGNOSIS — G47.30: ICD-10-CM

## 2021-04-26 DIAGNOSIS — K21.9: ICD-10-CM

## 2021-04-26 DIAGNOSIS — I10: ICD-10-CM

## 2021-04-26 DIAGNOSIS — E66.9: ICD-10-CM

## 2021-04-26 DIAGNOSIS — E78.00: ICD-10-CM

## 2021-04-26 DIAGNOSIS — Z79.4: ICD-10-CM

## 2021-04-26 DIAGNOSIS — E11.9: ICD-10-CM

## 2021-04-26 DIAGNOSIS — Z98.890: ICD-10-CM

## 2021-04-26 DIAGNOSIS — M16.0: Primary | ICD-10-CM

## 2021-04-26 DIAGNOSIS — J44.9: ICD-10-CM

## 2021-04-26 DIAGNOSIS — M19.90: ICD-10-CM

## 2021-04-26 DIAGNOSIS — F41.9: ICD-10-CM

## 2021-04-26 PROCEDURE — 77002 NEEDLE LOCALIZATION BY XRAY: CPT

## 2021-04-26 PROCEDURE — 20610 DRAIN/INJ JOINT/BURSA W/O US: CPT

## 2021-04-26 NOTE — RAD
EXAM: Fluoroscopic guided bilateral therapeutic injection.



HISTORY: Hip pain.



TECHNIQUE: The risks of the procedure were discussed with the patient and written and verbal consent 
was obtained. A time out was performed. Fluoroscopic imaging of the right hip was performed and a sit
e overlying the joint space was selected for needle entry. The skin overlying this region was sterile
ly prepped, draped and infiltrated with 1% lidocaine. A 22-gauge needle was then advanced into the bee
int space with fluoroscopic guidance. Appropriate needle tip positioning was confirmed with injection
 of 2 cc Omnipaque 300 contrast. Subsequently, a solution containing 8 cc bupivacaine 0.5 percent and
 2 cc Depo-Medrol 80 mg was injected into the joint space. Fluoroscopic images demonstrate intraartic
ular accumulation of contrast. The needle was removed and a sterile bandage was placed at the needle 
entry site.



Susceptibility, fluoroscopic imaging of the left hip was performed and site overlying the joint space
 with slight infrarenal entry. The skin overlying this region was sterilely prepped, draped and infil
trated with 1% lidocaine. A 22-gauge needle was then advanced into the joint space with fluoroscopic 
guidance. Appropriate needle tip positioning was confirmed with injection of 2 cc Omnipaque 300 contr
ast. Subsequently, a solution containing 8 cc bupivacaine 0.5 percent and 2 cc Depo-Medrol 80 mg was 
injected into the joint space. Fluoroscopic images demonstrate intraarticular accumulation of contras
t. The needle was removed and a sterile bandage was placed at the needle entry site.



The patient tolerated the procedure without difficulty and was discharged in stable condition. The to
amanda fluoroscopy time was 1.4 minutes and 6 fluoroscopic images were obtained



IMPRESSION: Successful fluoroscopic guided bilateral therapeutic hip injection.



Electronically signed by: Mony Cano MD (4/26/2021 10:59 AM) [id number]

## 2021-04-28 ENCOUNTER — HOSPITAL ENCOUNTER (OUTPATIENT)
Dept: HOSPITAL 61 - ECHO | Age: 46
End: 2021-04-28
Attending: INTERNAL MEDICINE
Payer: MEDICAID

## 2021-04-28 DIAGNOSIS — I08.0: Primary | ICD-10-CM

## 2021-04-28 PROCEDURE — C8929 TTE W OR WO FOL WCON,DOPPLER: HCPCS

## 2021-04-29 NOTE — CARD
MR#: [id number]

Account#: [id number]

Accession#: [id number]

Date of Study: 04/28/2021

Ordering Physician: FIDEL PAYNE, 

Referring Physician: FIDEL PAYNE, 

Tech: Katrina Medina [facility]





--------------- APPROVED REPORT --------------





EXAM: Two-dimensional and M-mode echocardiogram with Doppler and color Doppler.



Other Information 

Quality : Technically Limited

Technically limited study due to  morbid obesity



INDICATION

Murmur 



Echo Enhancing Agent

Agent/Amount Used: Optison 2mL



2D DIMENSIONS 

RVDd3.2 (2.9-3.5cm)Left Atrium(2D)3.3 (1.6-4.0cm)

IVSd1.1 (0.7-1.1cm)Aortic Root(2D)2.9 (2.0-3.7cm)

LVDd4.8 (3.9-5.9cm)LVOT Diameter2.1 (1.8-2.4cm)

PWd1.3 (0.7-1.1cm)LVDs3.5 (2.5-4.0cm)

FS (%) 28.1 %SV58.6 ml

LVEF(%)54.3 (>50%)



Aortic Valve

AoV Peak Gregg.362.1cm/sAoV VTI72.7cm

AO Peak GR.52.5mmHgLVOT Peak Gregg.97.5cm/s

AO Mean GR.32mmHgAVA (VMAX)0.89cm2

RUBEN   (VTI)1.00cm2



Mitral Valve

MV E Ayfjnjcn424.2cm/sMV DECEL [id number]

MV A Xfucqtyi81.7cm/sE/A  Ratio1.6



Tricuspid Valve

TR P. Mdrwhdev509ik/sRAP BOXDVOSU1uhHd

TR Peak Gr.99qcHtNMHB34sgSr



 LEFT VENTRICLE 

The left ventricle is normal size. There is mild concentric left ventricular hypertrophy. The left ve
ntricular systolic function is normal. The Ejection Fraction is 55-60%. There is normal LV segmental 
wall motion. The left ventricular diastolic function and filling is normal for age.



 RIGHT VENTRICLE 

The right ventricle is normal size. The right ventricular systolic function is normal.



 ATRIA 

The left atrium size is normal. The right atrium size is normal. The interatrial septum is intact wit
h no evidence for an atrial septal defect or patent foramen ovale as noted on 2-D or Doppler imaging.




 AORTIC VALVE 

The aortic valve is calcified and displays decreased opening. Doppler and Color Flow revealed no sign
ificant aortic regurgitation. Calculated aortic valve area is 1.0 cm2 with maximum pressure gradient 
of 53 mmHg and mean pressure gradient of 33 mmHg. Doppler and color-flow analysis revealed moderate a
ortic stenosis.



 MITRAL VALVE 

The mitral valve is normal in structure and function. Mitral annular calcification is mild. There is 
no evidence of mitral valve prolapse. There is no mitral valve stenosis. Doppler and Color Flow revea
led no mitral valve regurgitation noted.



 TRICUSPID VALVE 

The tricuspid valve is normal in structure and function. Doppler and Color Flow revealed trace tricus
pid regurgitation. The PA pressure was estimated at 26 mmHg. There is no tricuspid valve stenosis.



 PULMONIC VALVE 

The pulmonic valve is not well visualized. Doppler and Color Flow revealed no pulmonic valvular regur
gitation. There is no pulmonic valvular stenosis.



 GREAT VESSELS 

The aortic root is normal in size. The ascending aorta is normal in size. The IVC was not visualized.




 PERICARDIAL EFFUSION 

There is no evidence of significant pericardial effusion.



Critical Notification

Critical Value: No



<Conclusion>

Technically difficult study.  Optison echo contrast used for better definition.

The left ventricular systolic function is normal.

The Ejection Fraction is 55-60%.

There is normal LV segmental wall motion.

Moderate aortic stenosis with mean gradient 33 mm Hg.

Trace tricuspid regurgitation.

The PA pressure was estimated at 26 mmHg.

There is no evidence of significant pericardial effusion.



Signed by : Emiliano Yu, 

Electronically Approved : 04/29/2021 09:30:04

## 2021-05-02 ENCOUNTER — HOSPITAL ENCOUNTER (EMERGENCY)
Dept: HOSPITAL 61 - ER | Age: 46
Discharge: HOME | End: 2021-05-02
Payer: MEDICAID

## 2021-05-02 VITALS — WEIGHT: 315 LBS | HEIGHT: 66 IN | BODY MASS INDEX: 50.62 KG/M2

## 2021-05-02 VITALS
SYSTOLIC BLOOD PRESSURE: 154 MMHG | DIASTOLIC BLOOD PRESSURE: 65 MMHG | DIASTOLIC BLOOD PRESSURE: 65 MMHG | SYSTOLIC BLOOD PRESSURE: 154 MMHG

## 2021-05-02 DIAGNOSIS — E78.00: ICD-10-CM
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
## 2021-05-02 DIAGNOSIS — E11.40: ICD-10-CM

## 2021-05-02 DIAGNOSIS — J44.9: ICD-10-CM

## 2021-05-02 DIAGNOSIS — S05.01XA: Primary | ICD-10-CM

## 2021-05-02 PROCEDURE — 99284 EMERGENCY DEPT VISIT MOD MDM: CPT

## 2021-05-02 NOTE — ED.ADGEN
Past Medical History


Past Medical History:  Anemia, COPD, Diabetes-Type II, High Cholesterol, H

ypertension, Other


Additional Past Medical Histor:  neuropathy, degenerative ra lily hip/back, heart

valve only opens 1/2 way


Past Surgical History:  No Surgical History


Smoking Status:  Never Smoker


Alcohol Use:  Rarely


Drug Use:  None





General Adult


EDM:


Chief Complaint:  EYE PROBLEMS





HPI:


HPI:





Patient is a 45  year old 45-year-old male coming in for right eye pain.  

Patient states around 2 and half to 3 hours prior to arrival the dog jumped on 

him and scratched his right eye.  Patient states he has had eye pain and tearing

since.  No other injuries.  Patient states his tetanus is up-to-date.  Does not 

wear contacts or glasses.





Review of Systems:


Review of Systems:


All other systems within normal limits except for as noted in the HPI





Current Medications:





Current Medications








 Medications


  (Trade)  Dose


 Ordered  Sig/Danielito  Start Time


 Stop Time Status Last Admin


Dose Admin


 


 Fluorescein Sodium


  (Ful-Wanda)  1 strip  1X  ONCE  5/2/21 07:30


 5/2/21 07:31 DC 5/2/21 07:37


1 STRIP


 


 Tetracaine HCl


  (Tetracaine)  1 drop  1X  ONCE  5/2/21 07:30


 5/2/21 07:31 DC 5/2/21 07:37


1 DROP











Allergies:


Allergies:





Allergies








Coded Allergies Type Severity Reaction Last Updated Verified


 


  No Known Drug Allergies    12/2/18 No











Physical Exam:


PE:


Constitutional: Well developed, well nourished, no acute distress, non-toxic 

appearance. []


HENT: Normocephalic, atraumatic, bilateral external ears normal,  nose normal. 

[]


Eyes: PERRLA, conjunctiva normal, no discharge.  Extraocular was intact, pupils 

equal, viewed with fluorescein, abrasions over cornea, no Candida sign, no 

foreign body [] 


Neck: No rigidity, supple, no stridor. [] 


Cardiovascular: Regular rate and rhythm, brisk cap refill []


Lungs & Thorax: Non labored symmetric respirations, no tachypnea or respiratory 

distress []


Abdomen: Soft, nondistended.


Skin: Warm, dry, no erythema, no rash. [] 


Back: Unremarkable


Extremities: No deformities, range of motion grossly intact, no lower extremity 

edema [] 


Neurologic: Alert and oriented X 3, no focal deficits noted. []


Psychologic: Affect normal, judgement normal, mood normal. []





Current Patient Data:


Vital Signs:





                                   Vital Signs








  Date Time  Temp Pulse Resp B/P (MAP) Pulse Ox O2 Delivery O2 Flow Rate FiO2


 


5/2/21 07:15 98.0 99 18 154/65 (94) 95 Room Air  





 98.0       











EKG:


EKG:


[]





Heart Score:


C/O Chest Pain:  No


Risk Factors:


Risk Factors:  DM, Current or recent (<one month) smoker, HTN, HLP, family 

history of CAD, obesity.


Risk Scores:


Score 0 - 3:  2.5% MACE over next 6 weeks - Discharge Home


Score 4 - 6:  20.3% MACE over next 6 weeks - Admit for Clinical Observation


Score 7 - 10:  72.7% MACE over next 6 weeks - Early Invasive Strategies





Radiology/Procedures:


Radiology/Procedures:


[]





Course & Med Decision Making:


Course & Med Decision Making


Pertinent Labs and Imaging studies reviewed. (See chart for details)


Pain completely relieved with tetracaine


[]





Dragon Disclaimer:


Dragon Disclaimer:


This electronic medical record was generated, in whole or in part, using a voice

 recognition dictation system.





Departure


Departure


Impression:  


   Primary Impression:  


   Corneal abrasion, right


Disposition:  01 HOME / SELF CARE / HOMELESS


Condition:  STABLE


Referrals:  


BOOGIE RINCON MD (PCP)


Patient Instructions:  Eye - Corneal Abrasion





Additional Instructions:  


Ciprofloxacin eyedrops: 2 drops in right eye every 6 hours for 5 days


Scripts


Ketorolac Tromethamine (KETOROLAC TROMETHAMINE) 5 Ml Drops


1 DROP OD QID PRN for PAIN, #5 ML 0 Refills


   Prov: JUAN ANTONIO BLACK MD         5/2/21











JUAN ANTONIO BLACK MD             May 2, 2021 07:51

## 2021-05-24 ENCOUNTER — HOSPITAL ENCOUNTER (OUTPATIENT)
Dept: HOSPITAL 61 - LAB | Age: 46
End: 2021-05-24
Attending: INTERNAL MEDICINE
Payer: MEDICARE

## 2021-05-24 DIAGNOSIS — Z01.812: Primary | ICD-10-CM

## 2021-05-24 DIAGNOSIS — Z20.822: ICD-10-CM

## 2021-05-24 PROCEDURE — U0003 INFECTIOUS AGENT DETECTION BY NUCLEIC ACID (DNA OR RNA); SEVERE ACUTE RESPIRATORY SYNDROME CORONAVIRUS 2 (SARS-COV-2) (CORONAVIRUS DISEASE [COVID-19]), AMPLIFIED PROBE TECHNIQUE, MAKING USE OF HIGH THROUGHPUT TECHNOLOGIES AS DESCRIBED BY CMS-2020-01-R: HCPCS

## 2021-05-27 ENCOUNTER — HOSPITAL ENCOUNTER (OUTPATIENT)
Dept: HOSPITAL 61 - CCL | Age: 46
Discharge: HOME | End: 2021-05-27
Attending: INTERNAL MEDICINE
Payer: MEDICARE

## 2021-05-27 VITALS — DIASTOLIC BLOOD PRESSURE: 49 MMHG | SYSTOLIC BLOOD PRESSURE: 169 MMHG

## 2021-05-27 VITALS — HEIGHT: 66 IN | WEIGHT: 315 LBS | BODY MASS INDEX: 50.62 KG/M2

## 2021-05-27 VITALS — DIASTOLIC BLOOD PRESSURE: 64 MMHG | SYSTOLIC BLOOD PRESSURE: 134 MMHG

## 2021-05-27 VITALS — SYSTOLIC BLOOD PRESSURE: 120 MMHG | DIASTOLIC BLOOD PRESSURE: 82 MMHG

## 2021-05-27 DIAGNOSIS — F41.9: ICD-10-CM

## 2021-05-27 DIAGNOSIS — I38: ICD-10-CM

## 2021-05-27 DIAGNOSIS — Z72.89: ICD-10-CM

## 2021-05-27 DIAGNOSIS — K21.9: ICD-10-CM

## 2021-05-27 DIAGNOSIS — Z79.82: ICD-10-CM

## 2021-05-27 DIAGNOSIS — I27.20: ICD-10-CM

## 2021-05-27 DIAGNOSIS — M06.9: ICD-10-CM

## 2021-05-27 DIAGNOSIS — G47.30: ICD-10-CM

## 2021-05-27 DIAGNOSIS — F32.9: ICD-10-CM

## 2021-05-27 DIAGNOSIS — E66.01: ICD-10-CM

## 2021-05-27 DIAGNOSIS — Z79.899: ICD-10-CM

## 2021-05-27 DIAGNOSIS — Z98.890: ICD-10-CM

## 2021-05-27 DIAGNOSIS — R06.00: ICD-10-CM

## 2021-05-27 DIAGNOSIS — E11.9: ICD-10-CM

## 2021-05-27 DIAGNOSIS — E78.00: ICD-10-CM

## 2021-05-27 DIAGNOSIS — I35.0: Primary | ICD-10-CM

## 2021-05-27 DIAGNOSIS — J44.9: ICD-10-CM

## 2021-05-27 LAB
ANION GAP SERPL CALC-SCNC: 7 MMOL/L (ref 6–14)
BUN SERPL-MCNC: 40 MG/DL (ref 8–26)
CALCIUM SERPL-MCNC: 8.7 MG/DL (ref 8.5–10.1)
CHLORIDE SERPL-SCNC: 102 MMOL/L (ref 98–107)
CO2 SERPL-SCNC: 30 MMOL/L (ref 21–32)
CREAT SERPL-MCNC: 1.2 MG/DL (ref 0.7–1.3)
ERYTHROCYTE [DISTWIDTH] IN BLOOD BY AUTOMATED COUNT: 18.7 % (ref 11.5–14.5)
GFR SERPLBLD BASED ON 1.73 SQ M-ARVRAT: 64.9 ML/MIN
GLUCOSE SERPL-MCNC: 176 MG/DL (ref 70–99)
HCT VFR BLD CALC: 28.6 % (ref 39–53)
HGB BLD-MCNC: 9.2 G/DL (ref 13–17.5)
MCH RBC QN AUTO: 28 PG (ref 25–35)
MCHC RBC AUTO-ENTMCNC: 32 G/DL (ref 31–37)
MCV RBC AUTO: 89 FL (ref 79–100)
PLATELET # BLD AUTO: 200 X10^3/UL (ref 140–400)
POTASSIUM SERPL-SCNC: 4.5 MMOL/L (ref 3.5–5.1)
PROTHROMBIN TIME: 14 SEC (ref 11.7–14)
RBC # BLD AUTO: 3.24 X10^6/UL (ref 4.3–5.7)
SODIUM SERPL-SCNC: 139 MMOL/L (ref 136–145)
WBC # BLD AUTO: 7 X10^3/UL (ref 4–11)

## 2021-05-27 PROCEDURE — 93460 R&L HRT ART/VENTRICLE ANGIO: CPT

## 2021-05-27 PROCEDURE — 36415 COLL VENOUS BLD VENIPUNCTURE: CPT

## 2021-05-27 PROCEDURE — 85027 COMPLETE CBC AUTOMATED: CPT

## 2021-05-27 PROCEDURE — C1773 RET DEV, INSERTABLE: HCPCS

## 2021-05-27 PROCEDURE — 82962 GLUCOSE BLOOD TEST: CPT

## 2021-05-27 PROCEDURE — 93312 ECHO TRANSESOPHAGEAL: CPT

## 2021-05-27 PROCEDURE — 80048 BASIC METABOLIC PNL TOTAL CA: CPT

## 2021-05-27 PROCEDURE — 76937 US GUIDE VASCULAR ACCESS: CPT

## 2021-05-27 PROCEDURE — C1894 INTRO/SHEATH, NON-LASER: HCPCS

## 2021-05-27 PROCEDURE — 85610 PROTHROMBIN TIME: CPT

## 2021-05-27 PROCEDURE — 93320 DOPPLER ECHO COMPLETE: CPT

## 2021-05-27 PROCEDURE — 93306 TTE W/DOPPLER COMPLETE: CPT

## 2021-05-27 RX ADMIN — VERAPAMIL HYDROCHLORIDE ONE MG: 2.5 INJECTION, SOLUTION INTRAVENOUS at 10:30

## 2021-05-27 RX ADMIN — NITROGLYCERIN ONE MCG: 5 INJECTION, SOLUTION INTRAVENOUS at 10:01

## 2021-05-27 RX ADMIN — VERAPAMIL HYDROCHLORIDE ONE MG: 2.5 INJECTION, SOLUTION INTRAVENOUS at 10:01

## 2021-05-27 RX ADMIN — NITROGLYCERIN ONE MCG: 5 INJECTION, SOLUTION INTRAVENOUS at 10:30

## 2021-05-27 NOTE — CARD
MR#: [id number]

Account#: [id number]

Accession#: [id number]

Date of Study: 05/27/2021

Ordering Physician: FIDEL PAYNE, 

Referring Physician: FIDEL PAYNE, 

Tech: RT Gus(R)(VI)





--------------- APPROVED REPORT --------------





Technologist: RT Gus(R)(VI)

Nurse: Jess Sevilla RN



Procedure(s) performed: FL TIME: 6.0 MINS

DOSE: 92 GYCM2

CONTRAST: 91 ML

RHC, LHC, Aortic valve study

Coronary angiography



HISTORY

The patient is a 45 year-old male with a history of : chronic lung disease, tobacco history() , hyper
tension, dyslipidemia.



INDICATION

The indication(s) include : dyspnea, valvular heart disease.



CS Clinical Frailty Scale

[facility] Clinical Frailty Scale: Moderately Frail



Heart Failure

Heart Failure: Yes

If Yes, Newly Diagnosed: No

If Yes, HF Type: Diastolic     

If Yes, NYHA Class: Class III     



CASE TECHNIQUE

IV conscious sedation was used throughout procedure with appropriate monitoring and was performed in 
the presence of a registered nurse who was an independent trained observer other than the physician p
erforming the procedure. During this case, Fluoroscopy and low osmolar contrast were used for imaging
. Specimen(s) Removed: N/A Estimated Blood loss: 20 cc's.



PROCEDURE NARRATIVE

Clinical information:

45-year-old man with significant past medical history including hypertension, morbid obesity and prio
r history of lung disease presented to the catheterization laboratory for further evaluation of aorti
c valve disease.



Procedure details:

After appropriate informed consent the right wrist, right arm and left wrist were prepped and draped 
in usual sterile fashion.  Under 1% lidocaine local anesthesia and ultrasound guidance a 6 [place] she
ath was placed in the bilateral radial arteries via the Seldinger technique.  Next, a 5 [place] sheath
 was placed in the right brachial vein via ultrasound guidance under 1% lidocaine local anesthesia an
d a micropuncture kit.



Diagnostic angiography was performed with a 6 [place] JR4 catheter, 5 [place]ian PA catheter.  Aortic valv
e study was performed with a JR4 catheter in the LV and a JR4 catheter in the ascending aorta.



At case completion all catheters were removed and hemostasis was achieved in the bilateral radial art
eries with a Terumo radial band.  The right brachial vein sheath was removed and hemostasis was achie
hao with manual compression.



No acute complications are noted.  The case was completed under anesthesia support as the patient als
o underwent a RODNEY prior to the cardiac catheterization.  Please see anesthesia report for full detail
s of sedation.



Findings:

Right heart catheterization:

RA 21 mmHg

RV 70/20/23

PA 64/48/29

PCWP 29



PA saturation 68%

Arterial saturation 98%



Harper cardiac output: 7.1 L/min



Aortic valve study:

Aortic valve gradient: 32 mmHg

aortic valve area: 1.1 cm2



Coronary angiography:

Left main: Patient has congenital agenesis of the left main. The LAD and LCx arise from the right cor
onary cusp. 

LAD: Large caliber vessel with mild luminal irregularities. 

LCx: Moderate caliber vessel with normal angiographic appearance. 

RCA: Large caliber dominant vessel with normal angiographic appearance. 







Conclusion

1.  Severe biventricular pressure overload

2.  Severe secondary pulmonary hypertension

3.  Normal cardiac output

4.  Moderate aortic stenosis, mean gradient 32 mmHg with aortic valve area 1.1 cm2

5.  No significant coronary artery disease



Recommendations

1.  We will plan for aggressive diuresis and weight loss measures.  Ultimately, he may require a hurt
scatheter aortic valve replacement.



Signed by : Fidel Payne, 

Electronically Approved : 05/27/2021 11:26:10

## 2021-05-28 NOTE — CARD
MR#: [id number]

Account#: [id number]

Accession#: [id number]

Date of Study: 05/27/2021

Ordering Physician: FIDEL RED, 

Referring Physician: FIDEL RED, 

Tech: Whitley Castillo, [facility]





--------------- APPROVED REPORT --------------





EXAM: Two-dimensional and M-mode echocardiogram with Doppler and color Doppler.



INDICATION

Aortic Valve Disease



Echo Enhancing Agent

Indication: Rule Out Septal Defect

Agent/Amount Used: Agitated Saline 10mL



Aortic Valve

AoV Peak Gregg.353.6cm/sAoV VTI84.1cm

AO Peak GR.50.0mmHgAO Mean GR.32mmHg



Reason For Test : Rule out Intracardiac Thrombus.



PROCEDURE

After obtaining informed consent, patient underwent transesophageal echo in the Cath Lab.

Type of Sedation : General Anesthesia

Sedation was administered by . 

Sedation was achieved with Ketamine 10mg intravenously. 

Sedation was achieved with Propofol 60mg intravenously. 

Transesophageal probe was inserted and advanced into esophagus by Ruben Red MD.

Echo enhancement indication: R/O Septal defect.

Echo enhancement agent administered: Agitated Saline

The RODNEY was performed without complications. 

Throughout the procedure, the blood pressure, pulse oximetry, cardiac rhythm, and rate were monitored
.

The patient tolerated the procedure without adverse effects. Recovery from general anesthesia was une
ventful and vital signs were stable.



 LEFT VENTRICLE 

The left ventricle is normal size. There is mild concentric left ventricular hypertrophy. The left ve
ntricular systolic function is normal and the ejection fraction is within normal range. The Ejection 
Fraction is 55%. There is normal LV segmental wall motion. No left ventricle thrombus noted on this s
tudy.



 RIGHT VENTRICLE 

The right ventricle is normal size. There is normal right ventricular wall thickness. The right ventr
icular systolic function is normal.



 ATRIA 

The left atrium size is normal. The right atrium size is normal. The interatrial septum is intact wit
h no evidence for an atrial septal defect or patent foramen ovale as noted on 2-D or Doppler imaging.
 There is no thrombus noted in the left atrial appendage.



 AORTIC VALVE 

The aortic valve is calcified and displays decreased opening. Doppler and Color Flow revealed no sign
ificant aortic regurgitation. There is moderate valvular aortic stenosis with a mean gradient of 33 m
mHg.



 MITRAL VALVE 

The mitral valve is normal in structure and function. There is no evidence of mitral valve prolapse. 
There is no mitral valve stenosis. Doppler and Color Flow revealed no mitral valve regurgitation note
d.



 TRICUSPID VALVE 

The tricuspid valve is normal in structure and function. Doppler and Color Flow revealed trace tricus
pid regurgitation. There is no tricuspid valve stenosis.



 PULMONIC VALVE 

Doppler and Color Flow revealed no pulmonic valvular regurgitation. There is no pulmonic valvular perez
nosis.



 GREAT VESSELS 

The aortic root is normal in size. The IVC is normal in size and collapses >50% with inspiration.



 PERICARDIAL EFFUSION 

There is no pleural effusion.



Critical Notification

Critical Value: No



<Conclusion>

The left ventricular systolic function is normal and the ejection fraction is within normal range. Th
e Ejection Fraction is 55%.

There is normal LV segmental wall motion.

There is no thrombus noted in the left atrial appendage.

There is moderate valvular aortic stenosis with a mean gradient of 33 mmHg.



Signed by : Fidel Red, 

Electronically Approved : 05/28/2021 12:58:22